# Patient Record
Sex: FEMALE | Race: WHITE | Employment: OTHER | URBAN - METROPOLITAN AREA
[De-identification: names, ages, dates, MRNs, and addresses within clinical notes are randomized per-mention and may not be internally consistent; named-entity substitution may affect disease eponyms.]

---

## 2018-03-01 PROBLEM — I48.19 PERSISTENT ATRIAL FIBRILLATION (HCC): Status: ACTIVE | Noted: 2018-03-01

## 2018-03-01 PROBLEM — S72.8X1A OTHER FRACTURE OF RIGHT FEMUR, INITIAL ENCOUNTER FOR CLOSED FRACTURE (HCC): Status: ACTIVE | Noted: 2018-03-01

## 2018-03-02 PROBLEM — S72.031A CLOSED DISPLACED MIDCERVICAL FRACTURE OF RIGHT FEMUR (HCC): Status: ACTIVE | Noted: 2018-03-01

## 2018-05-15 ENCOUNTER — HOSPITAL ENCOUNTER (OUTPATIENT)
Dept: GENERAL RADIOLOGY | Age: 83
Discharge: HOME | End: 2018-05-17
Payer: MEDICARE

## 2018-05-15 ENCOUNTER — HOSPITAL ENCOUNTER (OUTPATIENT)
Age: 83
Discharge: HOME | End: 2018-05-17
Payer: MEDICARE

## 2018-05-15 ENCOUNTER — HOSPITAL ENCOUNTER (OUTPATIENT)
Dept: MRI IMAGING | Age: 83
Discharge: HOME | End: 2018-05-17
Payer: MEDICARE

## 2018-05-15 DIAGNOSIS — M25.552 PAIN IN LEFT HIP: ICD-10-CM

## 2018-05-15 DIAGNOSIS — M25.552 LEFT HIP PAIN: ICD-10-CM

## 2018-05-15 DIAGNOSIS — M54.5 LOW BACK PAIN, UNSPECIFIED BACK PAIN LATERALITY, UNSPECIFIED CHRONICITY, WITH SCIATICA PRESENCE UNSPECIFIED: ICD-10-CM

## 2018-05-15 PROCEDURE — 73721 MRI JNT OF LWR EXTRE W/O DYE: CPT

## 2018-05-15 PROCEDURE — 72110 X-RAY EXAM L-2 SPINE 4/>VWS: CPT

## 2018-05-15 PROCEDURE — 73502 X-RAY EXAM HIP UNI 2-3 VIEWS: CPT

## 2019-02-24 ENCOUNTER — ANESTHESIA (OUTPATIENT)
Dept: OPERATING ROOM | Age: 84
DRG: 470 | End: 2019-02-24
Payer: MEDICARE

## 2019-02-24 ENCOUNTER — APPOINTMENT (OUTPATIENT)
Dept: CT IMAGING | Age: 84
DRG: 470 | End: 2019-02-24
Payer: MEDICARE

## 2019-02-24 ENCOUNTER — HOSPITAL ENCOUNTER (INPATIENT)
Age: 84
LOS: 4 days | Discharge: NURSING, REHAB OR LONG-TERM CARE | DRG: 470 | End: 2019-02-28
Attending: EMERGENCY MEDICINE | Admitting: INTERNAL MEDICINE
Payer: MEDICARE

## 2019-02-24 ENCOUNTER — APPOINTMENT (OUTPATIENT)
Dept: GENERAL RADIOLOGY | Age: 84
DRG: 470 | End: 2019-02-24
Payer: MEDICARE

## 2019-02-24 ENCOUNTER — ANESTHESIA EVENT (OUTPATIENT)
Dept: OPERATING ROOM | Age: 84
DRG: 470 | End: 2019-02-24
Payer: MEDICARE
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
## 2019-02-24 DIAGNOSIS — S72.002A CLOSED FRACTURE OF LEFT HIP, INITIAL ENCOUNTER (HCC): Primary | ICD-10-CM

## 2019-02-24 DIAGNOSIS — S72.012A CLOSED SUBCAPITAL FRACTURE OF LEFT FEMUR, INITIAL ENCOUNTER (HCC): ICD-10-CM

## 2019-02-24 PROBLEM — S72.032A CLOSED DISPLACED MIDCERVICAL FRACTURE OF LEFT FEMUR (HCC): Status: ACTIVE | Noted: 2019-02-24

## 2019-02-24 LAB
ABO/RH: NORMAL
ANION GAP SERPL CALCULATED.3IONS-SCNC: 15 MMOL/L (ref 7–16)
ANTIBODY SCREEN: NORMAL
APTT: 30.4 SEC (ref 24.5–35.1)
BACTERIA: ABNORMAL /HPF
BASOPHILS ABSOLUTE: 0.03 E9/L (ref 0–0.2)
BASOPHILS RELATIVE PERCENT: 0.2 % (ref 0–2)
BILIRUBIN URINE: NEGATIVE
BLOOD, URINE: ABNORMAL
BUN BLDV-MCNC: 13 MG/DL (ref 8–23)
CALCIUM SERPL-MCNC: 9.5 MG/DL (ref 8.6–10.2)
CHLORIDE BLD-SCNC: 95 MMOL/L (ref 98–107)
CLARITY: CLEAR
CO2: 23 MMOL/L (ref 22–29)
COLOR: YELLOW
CREAT SERPL-MCNC: 0.6 MG/DL (ref 0.5–1)
EOSINOPHILS ABSOLUTE: 0.02 E9/L (ref 0.05–0.5)
EOSINOPHILS RELATIVE PERCENT: 0.1 % (ref 0–6)
GFR AFRICAN AMERICAN: >60
GFR NON-AFRICAN AMERICAN: >60 ML/MIN/1.73
GLUCOSE BLD-MCNC: 126 MG/DL (ref 74–99)
GLUCOSE URINE: NEGATIVE MG/DL
HCT VFR BLD CALC: 41.9 % (ref 34–48)
HEMOGLOBIN: 14.2 G/DL (ref 11.5–15.5)
IMMATURE GRANULOCYTES #: 0.07 E9/L
IMMATURE GRANULOCYTES %: 0.5 % (ref 0–5)
INR BLD: 1.3
KETONES, URINE: NEGATIVE MG/DL
LEUKOCYTE ESTERASE, URINE: NEGATIVE
LYMPHOCYTES ABSOLUTE: 0.73 E9/L (ref 1.5–4)
LYMPHOCYTES RELATIVE PERCENT: 5 % (ref 20–42)
MCH RBC QN AUTO: 29.6 PG (ref 26–35)
MCHC RBC AUTO-ENTMCNC: 33.9 % (ref 32–34.5)
MCV RBC AUTO: 87.3 FL (ref 80–99.9)
MONOCYTES ABSOLUTE: 0.74 E9/L (ref 0.1–0.95)
MONOCYTES RELATIVE PERCENT: 5.1 % (ref 2–12)
NEUTROPHILS ABSOLUTE: 13.06 E9/L (ref 1.8–7.3)
NEUTROPHILS RELATIVE PERCENT: 89.1 % (ref 43–80)
NITRITE, URINE: NEGATIVE
PDW BLD-RTO: 12.3 FL (ref 11.5–15)
PH UA: 7.5 (ref 5–9)
PLATELET # BLD: 245 E9/L (ref 130–450)
PMV BLD AUTO: 9.9 FL (ref 7–12)
POTASSIUM SERPL-SCNC: 3.9 MMOL/L (ref 3.5–5)
PROTEIN UA: NEGATIVE MG/DL
PROTHROMBIN TIME: 14.1 SEC (ref 9.3–12.4)
RBC # BLD: 4.8 E12/L (ref 3.5–5.5)
RBC UA: ABNORMAL /HPF (ref 0–2)
SODIUM BLD-SCNC: 133 MMOL/L (ref 132–146)
SPECIFIC GRAVITY UA: 1.01 (ref 1–1.03)
UROBILINOGEN, URINE: 0.2 E.U./DL
WBC # BLD: 14.7 E9/L (ref 4.5–11.5)
WBC UA: ABNORMAL /HPF (ref 0–5)

## 2019-02-24 PROCEDURE — 99285 EMERGENCY DEPT VISIT HI MDM: CPT

## 2019-02-24 PROCEDURE — 70450 CT HEAD/BRAIN W/O DYE: CPT

## 2019-02-24 PROCEDURE — 81001 URINALYSIS AUTO W/SCOPE: CPT

## 2019-02-24 PROCEDURE — 86900 BLOOD TYPING SEROLOGIC ABO: CPT

## 2019-02-24 PROCEDURE — 2580000003 HC RX 258: Performed by: INTERNAL MEDICINE

## 2019-02-24 PROCEDURE — 6360000002 HC RX W HCPCS: Performed by: EMERGENCY MEDICINE

## 2019-02-24 PROCEDURE — 6370000000 HC RX 637 (ALT 250 FOR IP): Performed by: INTERNAL MEDICINE

## 2019-02-24 PROCEDURE — 86901 BLOOD TYPING SEROLOGIC RH(D): CPT

## 2019-02-24 PROCEDURE — 1200000000 HC SEMI PRIVATE

## 2019-02-24 PROCEDURE — 73502 X-RAY EXAM HIP UNI 2-3 VIEWS: CPT

## 2019-02-24 PROCEDURE — 85610 PROTHROMBIN TIME: CPT

## 2019-02-24 PROCEDURE — 85025 COMPLETE CBC W/AUTO DIFF WBC: CPT

## 2019-02-24 PROCEDURE — 96374 THER/PROPH/DIAG INJ IV PUSH: CPT

## 2019-02-24 PROCEDURE — 80048 BASIC METABOLIC PNL TOTAL CA: CPT

## 2019-02-24 PROCEDURE — 73552 X-RAY EXAM OF FEMUR 2/>: CPT

## 2019-02-24 PROCEDURE — 96375 TX/PRO/DX INJ NEW DRUG ADDON: CPT

## 2019-02-24 PROCEDURE — 71045 X-RAY EXAM CHEST 1 VIEW: CPT

## 2019-02-24 PROCEDURE — 6370000000 HC RX 637 (ALT 250 FOR IP): Performed by: ORTHOPAEDIC SURGERY

## 2019-02-24 PROCEDURE — 85730 THROMBOPLASTIN TIME PARTIAL: CPT

## 2019-02-24 PROCEDURE — 86850 RBC ANTIBODY SCREEN: CPT

## 2019-02-24 RX ORDER — HYDROCODONE BITARTRATE AND ACETAMINOPHEN 5; 325 MG/1; MG/1
0.5 TABLET ORAL EVERY 6 HOURS PRN
Status: DISCONTINUED | OUTPATIENT
Start: 2019-02-24 | End: 2019-02-28 | Stop reason: HOSPADM

## 2019-02-24 RX ORDER — ONDANSETRON 2 MG/ML
4 INJECTION INTRAMUSCULAR; INTRAVENOUS ONCE
Status: COMPLETED | OUTPATIENT
Start: 2019-02-24 | End: 2019-02-24

## 2019-02-24 RX ORDER — SODIUM CHLORIDE 0.9 % (FLUSH) 0.9 %
10 SYRINGE (ML) INJECTION EVERY 12 HOURS SCHEDULED
Status: DISCONTINUED | OUTPATIENT
Start: 2019-02-24 | End: 2019-02-24

## 2019-02-24 RX ORDER — FENTANYL CITRATE 50 UG/ML
25 INJECTION, SOLUTION INTRAMUSCULAR; INTRAVENOUS ONCE
Status: COMPLETED | OUTPATIENT
Start: 2019-02-24 | End: 2019-02-24

## 2019-02-24 RX ORDER — MORPHINE SULFATE 2 MG/ML
2 INJECTION, SOLUTION INTRAMUSCULAR; INTRAVENOUS
Status: DISCONTINUED | OUTPATIENT
Start: 2019-02-24 | End: 2019-02-28 | Stop reason: HOSPADM

## 2019-02-24 RX ORDER — DOCUSATE SODIUM 100 MG/1
100 CAPSULE, LIQUID FILLED ORAL DAILY
Status: DISCONTINUED | OUTPATIENT
Start: 2019-02-24 | End: 2019-02-28 | Stop reason: HOSPADM

## 2019-02-24 RX ORDER — HYDROCODONE BITARTRATE AND ACETAMINOPHEN 5; 325 MG/1; MG/1
1 TABLET ORAL EVERY 6 HOURS PRN
Status: DISCONTINUED | OUTPATIENT
Start: 2019-02-24 | End: 2019-02-28 | Stop reason: HOSPADM

## 2019-02-24 RX ORDER — ERGOCALCIFEROL 1.25 MG/1
50000 CAPSULE ORAL ONCE
Status: DISCONTINUED | OUTPATIENT
Start: 2019-02-24 | End: 2019-02-28 | Stop reason: HOSPADM

## 2019-02-24 RX ORDER — ONDANSETRON 2 MG/ML
4 INJECTION INTRAMUSCULAR; INTRAVENOUS EVERY 6 HOURS PRN
Status: DISCONTINUED | OUTPATIENT
Start: 2019-02-24 | End: 2019-02-28 | Stop reason: HOSPADM

## 2019-02-24 RX ORDER — MORPHINE SULFATE 2 MG/ML
1 INJECTION, SOLUTION INTRAMUSCULAR; INTRAVENOUS
Status: DISCONTINUED | OUTPATIENT
Start: 2019-02-24 | End: 2019-02-28 | Stop reason: HOSPADM

## 2019-02-24 RX ORDER — MORPHINE SULFATE 2 MG/ML
2 INJECTION, SOLUTION INTRAMUSCULAR; INTRAVENOUS EVERY 4 HOURS PRN
Status: DISCONTINUED | OUTPATIENT
Start: 2019-02-24 | End: 2019-02-25

## 2019-02-24 RX ORDER — SODIUM CHLORIDE 0.9 % (FLUSH) 0.9 %
10 SYRINGE (ML) INJECTION PRN
Status: DISCONTINUED | OUTPATIENT
Start: 2019-02-24 | End: 2019-02-28 | Stop reason: HOSPADM

## 2019-02-24 RX ORDER — SODIUM CHLORIDE 0.9 % (FLUSH) 0.9 %
10 SYRINGE (ML) INJECTION EVERY 12 HOURS SCHEDULED
Status: DISCONTINUED | OUTPATIENT
Start: 2019-02-24 | End: 2019-02-28 | Stop reason: HOSPADM

## 2019-02-24 RX ORDER — SODIUM CHLORIDE 0.9 % (FLUSH) 0.9 %
10 SYRINGE (ML) INJECTION PRN
Status: DISCONTINUED | OUTPATIENT
Start: 2019-02-24 | End: 2019-02-26

## 2019-02-24 RX ORDER — ACETAMINOPHEN 325 MG/1
650 TABLET ORAL EVERY 4 HOURS PRN
Status: DISCONTINUED | OUTPATIENT
Start: 2019-02-24 | End: 2019-02-28 | Stop reason: HOSPADM

## 2019-02-24 RX ORDER — CEFAZOLIN SODIUM 2 G/50ML
2 SOLUTION INTRAVENOUS SEE ADMIN INSTRUCTIONS
Status: COMPLETED | OUTPATIENT
Start: 2019-02-24 | End: 2019-02-26

## 2019-02-24 RX ADMIN — Medication 10 ML: at 21:10

## 2019-02-24 RX ADMIN — METOPROLOL TARTRATE 25 MG: 25 TABLET ORAL at 21:10

## 2019-02-24 RX ADMIN — FENTANYL CITRATE 25 MCG: 50 INJECTION, SOLUTION INTRAMUSCULAR; INTRAVENOUS at 13:55

## 2019-02-24 RX ADMIN — ONDANSETRON 4 MG: 2 INJECTION INTRAMUSCULAR; INTRAVENOUS at 13:55

## 2019-02-24 RX ADMIN — HYDROCODONE BITARTRATE AND ACETAMINOPHEN 0.5 TABLET: 5; 325 TABLET ORAL at 16:33

## 2019-02-24 ASSESSMENT — ENCOUNTER SYMPTOMS
SHORTNESS OF BREATH: 0
RHINORRHEA: 0
VOMITING: 0
BLOOD IN STOOL: 0
CONSTIPATION: 0
ABDOMINAL PAIN: 0
BACK PAIN: 0
COUGH: 0
SORE THROAT: 0
NAUSEA: 1
DIARRHEA: 0
COLOR CHANGE: 0

## 2019-02-24 ASSESSMENT — PAIN DESCRIPTION - LOCATION
LOCATION: HIP

## 2019-02-24 ASSESSMENT — PAIN DESCRIPTION - DESCRIPTORS
DESCRIPTORS: THROBBING
DESCRIPTORS: THROBBING
DESCRIPTORS: DISCOMFORT

## 2019-02-24 ASSESSMENT — PAIN DESCRIPTION - PAIN TYPE
TYPE: ACUTE PAIN

## 2019-02-24 ASSESSMENT — PAIN DESCRIPTION - FREQUENCY
FREQUENCY: CONTINUOUS
FREQUENCY: CONTINUOUS

## 2019-02-24 ASSESSMENT — PAIN DESCRIPTION - ONSET
ONSET: ON-GOING
ONSET: ON-GOING

## 2019-02-24 ASSESSMENT — PAIN DESCRIPTION - ORIENTATION
ORIENTATION: LEFT

## 2019-02-24 ASSESSMENT — PAIN SCALES - GENERAL
PAINLEVEL_OUTOF10: 1
PAINLEVEL_OUTOF10: 0
PAINLEVEL_OUTOF10: 5
PAINLEVEL_OUTOF10: 3

## 2019-02-24 ASSESSMENT — PAIN - FUNCTIONAL ASSESSMENT
PAIN_FUNCTIONAL_ASSESSMENT: PREVENTS OR INTERFERES WITH MANY ACTIVE NOT PASSIVE ACTIVITIES
PAIN_FUNCTIONAL_ASSESSMENT: PREVENTS OR INTERFERES WITH MANY ACTIVE NOT PASSIVE ACTIVITIES

## 2019-02-25 PROBLEM — I10 HTN (HYPERTENSION), BENIGN: Chronic | Status: ACTIVE | Noted: 2019-02-25

## 2019-02-25 PROBLEM — S72.012A CLOSED SUBCAPITAL FRACTURE OF LEFT FEMUR (HCC): Status: ACTIVE | Noted: 2019-02-25

## 2019-02-25 PROBLEM — Z86.73 HISTORY OF COMPLETED STROKE: Chronic | Status: ACTIVE | Noted: 2019-02-25

## 2019-02-25 PROBLEM — I48.19 PERSISTENT ATRIAL FIBRILLATION (HCC): Chronic | Status: ACTIVE | Noted: 2018-03-01

## 2019-02-25 LAB
HCT VFR BLD CALC: 37.9 % (ref 34–48)
HEMOGLOBIN: 12.8 G/DL (ref 11.5–15.5)
MCH RBC QN AUTO: 29.4 PG (ref 26–35)
MCHC RBC AUTO-ENTMCNC: 33.8 % (ref 32–34.5)
MCV RBC AUTO: 87.1 FL (ref 80–99.9)
PDW BLD-RTO: 12.3 FL (ref 11.5–15)
PLATELET # BLD: 234 E9/L (ref 130–450)
PMV BLD AUTO: 9.6 FL (ref 7–12)
RBC # BLD: 4.35 E12/L (ref 3.5–5.5)
WBC # BLD: 10.5 E9/L (ref 4.5–11.5)

## 2019-02-25 PROCEDURE — 85027 COMPLETE CBC AUTOMATED: CPT

## 2019-02-25 PROCEDURE — 87081 CULTURE SCREEN ONLY: CPT

## 2019-02-25 PROCEDURE — 2580000003 HC RX 258: Performed by: INTERNAL MEDICINE

## 2019-02-25 PROCEDURE — 6370000000 HC RX 637 (ALT 250 FOR IP): Performed by: ORTHOPAEDIC SURGERY

## 2019-02-25 PROCEDURE — 36415 COLL VENOUS BLD VENIPUNCTURE: CPT

## 2019-02-25 PROCEDURE — 1200000000 HC SEMI PRIVATE

## 2019-02-25 PROCEDURE — 6370000000 HC RX 637 (ALT 250 FOR IP): Performed by: INTERNAL MEDICINE

## 2019-02-25 RX ORDER — SODIUM CHLORIDE 9 MG/ML
INJECTION, SOLUTION INTRAVENOUS CONTINUOUS
Status: DISCONTINUED | OUTPATIENT
Start: 2019-02-26 | End: 2019-02-27

## 2019-02-25 RX ORDER — DEXTROSE AND SODIUM CHLORIDE 5; .9 G/100ML; G/100ML
INJECTION, SOLUTION INTRAVENOUS CONTINUOUS
Status: DISCONTINUED | OUTPATIENT
Start: 2019-02-26 | End: 2019-02-27

## 2019-02-25 RX ORDER — PANTOPRAZOLE SODIUM 20 MG/1
20 TABLET, DELAYED RELEASE ORAL
Status: DISCONTINUED | OUTPATIENT
Start: 2019-02-26 | End: 2019-02-28 | Stop reason: HOSPADM

## 2019-02-25 RX ORDER — CETIRIZINE HYDROCHLORIDE 10 MG/1
5 TABLET ORAL DAILY
Status: DISCONTINUED | OUTPATIENT
Start: 2019-02-25 | End: 2019-02-28 | Stop reason: HOSPADM

## 2019-02-25 RX ADMIN — METOPROLOL TARTRATE 25 MG: 25 TABLET ORAL at 09:33

## 2019-02-25 RX ADMIN — Medication 10 ML: at 21:39

## 2019-02-25 RX ADMIN — Medication 10 ML: at 09:34

## 2019-02-25 RX ADMIN — HYDROCODONE BITARTRATE AND ACETAMINOPHEN 1 TABLET: 5; 325 TABLET ORAL at 11:47

## 2019-02-25 RX ADMIN — METOPROLOL TARTRATE 25 MG: 25 TABLET ORAL at 21:38

## 2019-02-25 RX ADMIN — DOCUSATE SODIUM 100 MG: 100 CAPSULE, LIQUID FILLED ORAL at 09:33

## 2019-02-25 ASSESSMENT — PAIN SCALES - GENERAL
PAINLEVEL_OUTOF10: 4
PAINLEVEL_OUTOF10: 0

## 2019-02-26 ENCOUNTER — APPOINTMENT (OUTPATIENT)
Dept: GENERAL RADIOLOGY | Age: 84
DRG: 470 | End: 2019-02-26
Payer: MEDICARE

## 2019-02-26 VITALS — SYSTOLIC BLOOD PRESSURE: 152 MMHG | OXYGEN SATURATION: 100 % | DIASTOLIC BLOOD PRESSURE: 70 MMHG | TEMPERATURE: 98.2 F

## 2019-02-26 PROBLEM — E43 SEVERE PROTEIN-CALORIE MALNUTRITION (HCC): Chronic | Status: ACTIVE | Noted: 2019-02-26

## 2019-02-26 PROCEDURE — 2709999900 HC NON-CHARGEABLE SUPPLY: Performed by: ORTHOPAEDIC SURGERY

## 2019-02-26 PROCEDURE — 3700000001 HC ADD 15 MINUTES (ANESTHESIA): Performed by: ORTHOPAEDIC SURGERY

## 2019-02-26 PROCEDURE — 3700000000 HC ANESTHESIA ATTENDED CARE: Performed by: ORTHOPAEDIC SURGERY

## 2019-02-26 PROCEDURE — 7100000001 HC PACU RECOVERY - ADDTL 15 MIN: Performed by: ORTHOPAEDIC SURGERY

## 2019-02-26 PROCEDURE — 1200000000 HC SEMI PRIVATE

## 2019-02-26 PROCEDURE — 73501 X-RAY EXAM HIP UNI 1 VIEW: CPT

## 2019-02-26 PROCEDURE — 3600000004 HC SURGERY LEVEL 4 BASE: Performed by: ORTHOPAEDIC SURGERY

## 2019-02-26 PROCEDURE — 7100000000 HC PACU RECOVERY - FIRST 15 MIN: Performed by: ORTHOPAEDIC SURGERY

## 2019-02-26 PROCEDURE — 3600000014 HC SURGERY LEVEL 4 ADDTL 15MIN: Performed by: ORTHOPAEDIC SURGERY

## 2019-02-26 PROCEDURE — 6370000000 HC RX 637 (ALT 250 FOR IP): Performed by: INTERNAL MEDICINE

## 2019-02-26 PROCEDURE — 2580000003 HC RX 258: Performed by: INTERNAL MEDICINE

## 2019-02-26 PROCEDURE — C1776 JOINT DEVICE (IMPLANTABLE): HCPCS | Performed by: ORTHOPAEDIC SURGERY

## 2019-02-26 PROCEDURE — 2500000003 HC RX 250 WO HCPCS: Performed by: NURSE ANESTHETIST, CERTIFIED REGISTERED

## 2019-02-26 PROCEDURE — 0SRS0JA REPLACEMENT OF LEFT HIP JOINT, FEMORAL SURFACE WITH SYNTHETIC SUBSTITUTE, UNCEMENTED, OPEN APPROACH: ICD-10-PCS | Performed by: ORTHOPAEDIC SURGERY

## 2019-02-26 PROCEDURE — 2580000003 HC RX 258: Performed by: NURSE ANESTHETIST, CERTIFIED REGISTERED

## 2019-02-26 PROCEDURE — 6360000002 HC RX W HCPCS: Performed by: ORTHOPAEDIC SURGERY

## 2019-02-26 PROCEDURE — 2580000003 HC RX 258: Performed by: ORTHOPAEDIC SURGERY

## 2019-02-26 PROCEDURE — 6360000002 HC RX W HCPCS: Performed by: NURSE ANESTHETIST, CERTIFIED REGISTERED

## 2019-02-26 DEVICE — IMPLANTABLE DEVICE: Type: IMPLANTABLE DEVICE | Site: HIP | Status: FUNCTIONAL

## 2019-02-26 DEVICE — HEAD FEM DIA26MM +0MM OFFSET HIP CO CHROM V40 TAPR LO FRIC: Type: IMPLANTABLE DEVICE | Site: HIP | Status: FUNCTIONAL

## 2019-02-26 DEVICE — HEAD FEM OD43MM ID26MM UNIV CO CHROM COMPHSVE SZ ARRY FOR: Type: IMPLANTABLE DEVICE | Site: HIP | Status: FUNCTIONAL

## 2019-02-26 RX ORDER — ROCURONIUM BROMIDE 10 MG/ML
INJECTION, SOLUTION INTRAVENOUS PRN
Status: DISCONTINUED | OUTPATIENT
Start: 2019-02-26 | End: 2019-02-26 | Stop reason: SDUPTHER

## 2019-02-26 RX ORDER — ONDANSETRON 2 MG/ML
INJECTION INTRAMUSCULAR; INTRAVENOUS PRN
Status: DISCONTINUED | OUTPATIENT
Start: 2019-02-26 | End: 2019-02-26 | Stop reason: SDUPTHER

## 2019-02-26 RX ORDER — HYDROCODONE BITARTRATE AND ACETAMINOPHEN 5; 325 MG/1; MG/1
.5-1 TABLET ORAL EVERY 6 HOURS PRN
Qty: 28 TABLET | Refills: 0 | Status: SHIPPED | OUTPATIENT
Start: 2019-02-26 | End: 2019-03-05

## 2019-02-26 RX ORDER — LIDOCAINE HYDROCHLORIDE 20 MG/ML
INJECTION, SOLUTION EPIDURAL; INFILTRATION; INTRACAUDAL; PERINEURAL PRN
Status: DISCONTINUED | OUTPATIENT
Start: 2019-02-26 | End: 2019-02-26 | Stop reason: SDUPTHER

## 2019-02-26 RX ORDER — CEFAZOLIN SODIUM 1 G/50ML
1 SOLUTION INTRAVENOUS EVERY 8 HOURS
Status: COMPLETED | OUTPATIENT
Start: 2019-02-27 | End: 2019-02-27

## 2019-02-26 RX ORDER — PROPOFOL 10 MG/ML
INJECTION, EMULSION INTRAVENOUS PRN
Status: DISCONTINUED | OUTPATIENT
Start: 2019-02-26 | End: 2019-02-26 | Stop reason: SDUPTHER

## 2019-02-26 RX ORDER — NEOSTIGMINE METHYLSULFATE 1 MG/ML
INJECTION, SOLUTION INTRAVENOUS PRN
Status: DISCONTINUED | OUTPATIENT
Start: 2019-02-26 | End: 2019-02-26 | Stop reason: SDUPTHER

## 2019-02-26 RX ORDER — FENTANYL CITRATE 50 UG/ML
INJECTION, SOLUTION INTRAMUSCULAR; INTRAVENOUS PRN
Status: DISCONTINUED | OUTPATIENT
Start: 2019-02-26 | End: 2019-02-26 | Stop reason: SDUPTHER

## 2019-02-26 RX ORDER — DEXAMETHASONE SODIUM PHOSPHATE 4 MG/ML
INJECTION, SOLUTION INTRA-ARTICULAR; INTRALESIONAL; INTRAMUSCULAR; INTRAVENOUS; SOFT TISSUE PRN
Status: DISCONTINUED | OUTPATIENT
Start: 2019-02-26 | End: 2019-02-26 | Stop reason: SDUPTHER

## 2019-02-26 RX ORDER — GLYCOPYRROLATE 0.2 MG/ML
INJECTION INTRAMUSCULAR; INTRAVENOUS PRN
Status: DISCONTINUED | OUTPATIENT
Start: 2019-02-26 | End: 2019-02-26 | Stop reason: SDUPTHER

## 2019-02-26 RX ORDER — SODIUM CHLORIDE, SODIUM LACTATE, POTASSIUM CHLORIDE, CALCIUM CHLORIDE 600; 310; 30; 20 MG/100ML; MG/100ML; MG/100ML; MG/100ML
INJECTION, SOLUTION INTRAVENOUS CONTINUOUS PRN
Status: DISCONTINUED | OUTPATIENT
Start: 2019-02-26 | End: 2019-02-26 | Stop reason: SDUPTHER

## 2019-02-26 RX ADMIN — LIDOCAINE HYDROCHLORIDE 50 MG: 20 INJECTION, SOLUTION EPIDURAL; INFILTRATION; INTRACAUDAL; PERINEURAL at 18:33

## 2019-02-26 RX ADMIN — GLYCOPYRROLATE 0.4 MG: 0.2 INJECTION, SOLUTION INTRAMUSCULAR; INTRAVENOUS at 19:36

## 2019-02-26 RX ADMIN — PANTOPRAZOLE SODIUM 20 MG: 20 TABLET, DELAYED RELEASE ORAL at 07:03

## 2019-02-26 RX ADMIN — Medication 2 MG: at 19:36

## 2019-02-26 RX ADMIN — METOPROLOL TARTRATE 25 MG: 25 TABLET ORAL at 21:04

## 2019-02-26 RX ADMIN — ONDANSETRON HYDROCHLORIDE 4 MG: 2 INJECTION, SOLUTION INTRAMUSCULAR; INTRAVENOUS at 18:50

## 2019-02-26 RX ADMIN — CEFAZOLIN SODIUM 2 G: 2 SOLUTION INTRAVENOUS at 18:28

## 2019-02-26 RX ADMIN — PROPOFOL 80 MG: 10 INJECTION, EMULSION INTRAVENOUS at 18:33

## 2019-02-26 RX ADMIN — ROCURONIUM BROMIDE 30 MG: 10 SOLUTION INTRAVENOUS at 18:33

## 2019-02-26 RX ADMIN — SODIUM CHLORIDE: 9 INJECTION, SOLUTION INTRAVENOUS at 09:11

## 2019-02-26 RX ADMIN — SODIUM CHLORIDE, POTASSIUM CHLORIDE, SODIUM LACTATE AND CALCIUM CHLORIDE: 600; 310; 30; 20 INJECTION, SOLUTION INTRAVENOUS at 18:28

## 2019-02-26 RX ADMIN — DEXAMETHASONE SODIUM PHOSPHATE 8 MG: 4 INJECTION, SOLUTION INTRAMUSCULAR; INTRAVENOUS at 18:50

## 2019-02-26 RX ADMIN — METOPROLOL TARTRATE 25 MG: 25 TABLET ORAL at 09:11

## 2019-02-26 RX ADMIN — DEXTROSE AND SODIUM CHLORIDE: 5; 900 INJECTION, SOLUTION INTRAVENOUS at 00:03

## 2019-02-26 RX ADMIN — FENTANYL CITRATE 100 MCG: 50 INJECTION, SOLUTION INTRAMUSCULAR; INTRAVENOUS at 18:33

## 2019-02-26 ASSESSMENT — PULMONARY FUNCTION TESTS
PIF_VALUE: 18
PIF_VALUE: 2
PIF_VALUE: 19
PIF_VALUE: 18
PIF_VALUE: 2
PIF_VALUE: 19
PIF_VALUE: 18
PIF_VALUE: 19
PIF_VALUE: 18
PIF_VALUE: 18
PIF_VALUE: 16
PIF_VALUE: 18
PIF_VALUE: 14
PIF_VALUE: 20
PIF_VALUE: 18
PIF_VALUE: 18
PIF_VALUE: 19
PIF_VALUE: 3
PIF_VALUE: 17
PIF_VALUE: 18
PIF_VALUE: 6
PIF_VALUE: 18
PIF_VALUE: 18
PIF_VALUE: 0
PIF_VALUE: 18
PIF_VALUE: 10
PIF_VALUE: 17
PIF_VALUE: 18
PIF_VALUE: 2
PIF_VALUE: 17
PIF_VALUE: 6
PIF_VALUE: 17
PIF_VALUE: 18
PIF_VALUE: 18
PIF_VALUE: 23
PIF_VALUE: 0
PIF_VALUE: 18
PIF_VALUE: 25
PIF_VALUE: 18
PIF_VALUE: 2
PIF_VALUE: 18
PIF_VALUE: 8
PIF_VALUE: 24
PIF_VALUE: 19
PIF_VALUE: 17
PIF_VALUE: 1
PIF_VALUE: 3
PIF_VALUE: 18
PIF_VALUE: 18
PIF_VALUE: 17
PIF_VALUE: 18
PIF_VALUE: 2
PIF_VALUE: 15
PIF_VALUE: 18
PIF_VALUE: 18
PIF_VALUE: 0
PIF_VALUE: 0
PIF_VALUE: 18
PIF_VALUE: 0
PIF_VALUE: 18
PIF_VALUE: 18

## 2019-02-26 ASSESSMENT — PAIN DESCRIPTION - PAIN TYPE
TYPE: ACUTE PAIN
TYPE: SURGICAL PAIN

## 2019-02-26 ASSESSMENT — PAIN DESCRIPTION - DESCRIPTORS: DESCRIPTORS: ACHING;DISCOMFORT

## 2019-02-26 ASSESSMENT — PAIN SCALES - GENERAL
PAINLEVEL_OUTOF10: 0
PAINLEVEL_OUTOF10: 5

## 2019-02-26 ASSESSMENT — PAIN DESCRIPTION - LOCATION
LOCATION: HIP

## 2019-02-26 ASSESSMENT — PAIN DESCRIPTION - ONSET: ONSET: GRADUAL

## 2019-02-26 ASSESSMENT — PAIN DESCRIPTION - FREQUENCY: FREQUENCY: CONTINUOUS

## 2019-02-26 ASSESSMENT — PAIN DESCRIPTION - PROGRESSION: CLINICAL_PROGRESSION: GRADUALLY WORSENING

## 2019-02-26 ASSESSMENT — PAIN - FUNCTIONAL ASSESSMENT: PAIN_FUNCTIONAL_ASSESSMENT: PREVENTS OR INTERFERES SOME ACTIVE ACTIVITIES AND ADLS

## 2019-02-26 ASSESSMENT — PAIN DESCRIPTION - ORIENTATION
ORIENTATION: LEFT

## 2019-02-27 LAB
ANION GAP SERPL CALCULATED.3IONS-SCNC: 12 MMOL/L (ref 7–16)
BUN BLDV-MCNC: 13 MG/DL (ref 8–23)
CALCIUM SERPL-MCNC: 7.7 MG/DL (ref 8.6–10.2)
CHLORIDE BLD-SCNC: 100 MMOL/L (ref 98–107)
CO2: 22 MMOL/L (ref 22–29)
CREAT SERPL-MCNC: 0.5 MG/DL (ref 0.5–1)
GFR AFRICAN AMERICAN: >60
GFR NON-AFRICAN AMERICAN: >60 ML/MIN/1.73
GLUCOSE BLD-MCNC: 150 MG/DL (ref 74–99)
HCT VFR BLD CALC: 36.1 % (ref 34–48)
HEMOGLOBIN: 11.8 G/DL (ref 11.5–15.5)
MCH RBC QN AUTO: 28.9 PG (ref 26–35)
MCHC RBC AUTO-ENTMCNC: 32.7 % (ref 32–34.5)
MCV RBC AUTO: 88.5 FL (ref 80–99.9)
MRSA CULTURE ONLY: NORMAL
PDW BLD-RTO: 12.2 FL (ref 11.5–15)
PLATELET # BLD: 190 E9/L (ref 130–450)
PMV BLD AUTO: 9.7 FL (ref 7–12)
POTASSIUM SERPL-SCNC: 3.9 MMOL/L (ref 3.5–5)
RBC # BLD: 4.08 E12/L (ref 3.5–5.5)
SODIUM BLD-SCNC: 134 MMOL/L (ref 132–146)
WBC # BLD: 8.1 E9/L (ref 4.5–11.5)

## 2019-02-27 PROCEDURE — 88305 TISSUE EXAM BY PATHOLOGIST: CPT

## 2019-02-27 PROCEDURE — 1200000000 HC SEMI PRIVATE

## 2019-02-27 PROCEDURE — 6370000000 HC RX 637 (ALT 250 FOR IP): Performed by: INTERNAL MEDICINE

## 2019-02-27 PROCEDURE — 97165 OT EVAL LOW COMPLEX 30 MIN: CPT

## 2019-02-27 PROCEDURE — 6370000000 HC RX 637 (ALT 250 FOR IP): Performed by: ORTHOPAEDIC SURGERY

## 2019-02-27 PROCEDURE — 6360000002 HC RX W HCPCS: Performed by: ORTHOPAEDIC SURGERY

## 2019-02-27 PROCEDURE — 2580000003 HC RX 258: Performed by: INTERNAL MEDICINE

## 2019-02-27 PROCEDURE — 88311 DECALCIFY TISSUE: CPT

## 2019-02-27 PROCEDURE — 97530 THERAPEUTIC ACTIVITIES: CPT

## 2019-02-27 PROCEDURE — 36415 COLL VENOUS BLD VENIPUNCTURE: CPT

## 2019-02-27 PROCEDURE — 85027 COMPLETE CBC AUTOMATED: CPT

## 2019-02-27 PROCEDURE — 97161 PT EVAL LOW COMPLEX 20 MIN: CPT

## 2019-02-27 PROCEDURE — 80048 BASIC METABOLIC PNL TOTAL CA: CPT

## 2019-02-27 RX ADMIN — HYDROCODONE BITARTRATE AND ACETAMINOPHEN 1 TABLET: 5; 325 TABLET ORAL at 21:50

## 2019-02-27 RX ADMIN — METOPROLOL TARTRATE 25 MG: 25 TABLET ORAL at 21:50

## 2019-02-27 RX ADMIN — Medication 10 ML: at 21:00

## 2019-02-27 RX ADMIN — CEFAZOLIN SODIUM 1 G: 1 SOLUTION INTRAVENOUS at 02:20

## 2019-02-27 RX ADMIN — CEFAZOLIN SODIUM 1 G: 1 SOLUTION INTRAVENOUS at 09:45

## 2019-02-27 RX ADMIN — HYDROCODONE BITARTRATE AND ACETAMINOPHEN 1 TABLET: 5; 325 TABLET ORAL at 06:48

## 2019-02-27 RX ADMIN — APIXABAN 2.5 MG: 2.5 TABLET, FILM COATED ORAL at 21:50

## 2019-02-27 RX ADMIN — MORPHINE SULFATE 1 MG: 2 INJECTION, SOLUTION INTRAMUSCULAR; INTRAVENOUS at 02:56

## 2019-02-27 RX ADMIN — METOPROLOL TARTRATE 25 MG: 25 TABLET ORAL at 09:02

## 2019-02-27 RX ADMIN — PANTOPRAZOLE SODIUM 20 MG: 20 TABLET, DELAYED RELEASE ORAL at 06:48

## 2019-02-27 RX ADMIN — DOCUSATE SODIUM 100 MG: 100 CAPSULE, LIQUID FILLED ORAL at 09:02

## 2019-02-27 ASSESSMENT — PAIN DESCRIPTION - DESCRIPTORS
DESCRIPTORS: ACHING;DISCOMFORT

## 2019-02-27 ASSESSMENT — PAIN - FUNCTIONAL ASSESSMENT
PAIN_FUNCTIONAL_ASSESSMENT: PREVENTS OR INTERFERES SOME ACTIVE ACTIVITIES AND ADLS

## 2019-02-27 ASSESSMENT — PAIN SCALES - GENERAL
PAINLEVEL_OUTOF10: 6
PAINLEVEL_OUTOF10: 0
PAINLEVEL_OUTOF10: 5
PAINLEVEL_OUTOF10: 5

## 2019-02-27 ASSESSMENT — PAIN DESCRIPTION - FREQUENCY
FREQUENCY: CONTINUOUS

## 2019-02-27 ASSESSMENT — PAIN DESCRIPTION - PROGRESSION
CLINICAL_PROGRESSION: GRADUALLY WORSENING

## 2019-02-27 ASSESSMENT — PAIN DESCRIPTION - PAIN TYPE
TYPE: SURGICAL PAIN

## 2019-02-27 ASSESSMENT — PAIN DESCRIPTION - ORIENTATION
ORIENTATION: LEFT

## 2019-02-27 ASSESSMENT — PAIN DESCRIPTION - LOCATION
LOCATION: HIP

## 2019-02-27 ASSESSMENT — PAIN DESCRIPTION - ONSET
ONSET: GRADUAL

## 2019-02-28 VITALS
OXYGEN SATURATION: 98 % | HEIGHT: 65 IN | DIASTOLIC BLOOD PRESSURE: 56 MMHG | BODY MASS INDEX: 17.99 KG/M2 | RESPIRATION RATE: 16 BRPM | TEMPERATURE: 97.7 F | WEIGHT: 108 LBS | SYSTOLIC BLOOD PRESSURE: 107 MMHG | HEART RATE: 80 BPM

## 2019-02-28 PROBLEM — I48.0 PAROXYSMAL ATRIAL FIBRILLATION (HCC): Chronic | Status: ACTIVE | Noted: 2019-02-28

## 2019-02-28 LAB
EKG ATRIAL RATE: 74 BPM
EKG P AXIS: 58 DEGREES
EKG P-R INTERVAL: 152 MS
EKG Q-T INTERVAL: 394 MS
EKG QRS DURATION: 64 MS
EKG QTC CALCULATION (BAZETT): 437 MS
EKG R AXIS: 5 DEGREES
EKG T AXIS: 52 DEGREES
EKG VENTRICULAR RATE: 74 BPM
HCT VFR BLD CALC: 29.8 % (ref 34–48)
HEMOGLOBIN: 10.2 G/DL (ref 11.5–15.5)
MCH RBC QN AUTO: 29.9 PG (ref 26–35)
MCHC RBC AUTO-ENTMCNC: 34.2 % (ref 32–34.5)
MCV RBC AUTO: 87.4 FL (ref 80–99.9)
PDW BLD-RTO: 12.4 FL (ref 11.5–15)
PLATELET # BLD: 189 E9/L (ref 130–450)
PMV BLD AUTO: 9.6 FL (ref 7–12)
RBC # BLD: 3.41 E12/L (ref 3.5–5.5)
WBC # BLD: 7.2 E9/L (ref 4.5–11.5)

## 2019-02-28 PROCEDURE — 97530 THERAPEUTIC ACTIVITIES: CPT

## 2019-02-28 PROCEDURE — 6370000000 HC RX 637 (ALT 250 FOR IP): Performed by: INTERNAL MEDICINE

## 2019-02-28 PROCEDURE — 85027 COMPLETE CBC AUTOMATED: CPT

## 2019-02-28 PROCEDURE — 36415 COLL VENOUS BLD VENIPUNCTURE: CPT

## 2019-02-28 PROCEDURE — 97535 SELF CARE MNGMENT TRAINING: CPT

## 2019-02-28 PROCEDURE — 2580000003 HC RX 258: Performed by: INTERNAL MEDICINE

## 2019-02-28 PROCEDURE — 97110 THERAPEUTIC EXERCISES: CPT

## 2019-02-28 PROCEDURE — 6370000000 HC RX 637 (ALT 250 FOR IP): Performed by: ORTHOPAEDIC SURGERY

## 2019-02-28 RX ORDER — PSEUDOEPHEDRINE HCL 30 MG
100 TABLET ORAL DAILY
DISCHARGE
Start: 2019-03-01

## 2019-02-28 RX ORDER — ERGOCALCIFEROL (VITAMIN D2) 1250 MCG
50000 CAPSULE ORAL ONCE
Qty: 1 CAPSULE | Refills: 0 | Status: ON HOLD | DISCHARGE
Start: 2019-02-28 | End: 2022-06-22 | Stop reason: HOSPADM

## 2019-02-28 RX ADMIN — HYDROCODONE BITARTRATE AND ACETAMINOPHEN 1 TABLET: 5; 325 TABLET ORAL at 06:45

## 2019-02-28 RX ADMIN — PANTOPRAZOLE SODIUM 20 MG: 20 TABLET, DELAYED RELEASE ORAL at 06:45

## 2019-02-28 RX ADMIN — Medication 10 ML: at 09:03

## 2019-02-28 RX ADMIN — METOPROLOL TARTRATE 25 MG: 25 TABLET ORAL at 09:04

## 2019-02-28 RX ADMIN — CETIRIZINE HYDROCHLORIDE 5 MG: 10 TABLET, FILM COATED ORAL at 09:04

## 2019-02-28 RX ADMIN — APIXABAN 2.5 MG: 2.5 TABLET, FILM COATED ORAL at 09:04

## 2019-02-28 ASSESSMENT — PAIN SCALES - GENERAL
PAINLEVEL_OUTOF10: 5
PAINLEVEL_OUTOF10: 0

## 2019-02-28 ASSESSMENT — PAIN DESCRIPTION - DESCRIPTORS: DESCRIPTORS: ACHING;DISCOMFORT

## 2019-02-28 ASSESSMENT — PAIN DESCRIPTION - LOCATION
LOCATION: HIP
LOCATION: HIP

## 2019-02-28 ASSESSMENT — PAIN DESCRIPTION - ONSET: ONSET: GRADUAL

## 2019-02-28 ASSESSMENT — PAIN - FUNCTIONAL ASSESSMENT: PAIN_FUNCTIONAL_ASSESSMENT: PREVENTS OR INTERFERES SOME ACTIVE ACTIVITIES AND ADLS

## 2019-02-28 ASSESSMENT — PAIN DESCRIPTION - PROGRESSION: CLINICAL_PROGRESSION: GRADUALLY WORSENING

## 2019-02-28 ASSESSMENT — PAIN DESCRIPTION - ORIENTATION
ORIENTATION: LEFT
ORIENTATION: LEFT

## 2019-02-28 ASSESSMENT — PAIN DESCRIPTION - PAIN TYPE
TYPE: SURGICAL PAIN
TYPE: SURGICAL PAIN

## 2019-02-28 ASSESSMENT — PAIN DESCRIPTION - FREQUENCY: FREQUENCY: CONTINUOUS

## 2022-06-19 ENCOUNTER — HOSPITAL ENCOUNTER (EMERGENCY)
Age: 87
Discharge: ANOTHER HOSPITAL | End: 2022-06-19
Attending: EMERGENCY MEDICINE
Payer: MEDICARE

## 2022-06-19 ENCOUNTER — APPOINTMENT (OUTPATIENT)
Dept: GENERAL RADIOLOGY | Age: 87
End: 2022-06-19
Payer: MEDICARE

## 2022-06-19 ENCOUNTER — HOSPITAL ENCOUNTER (INPATIENT)
Age: 87
LOS: 4 days | Discharge: NURSING, REHAB OR LONG-TERM CARE | DRG: 552 | End: 2022-06-23
Attending: STUDENT IN AN ORGANIZED HEALTH CARE EDUCATION/TRAINING PROGRAM | Admitting: SURGERY
Payer: MEDICARE

## 2022-06-19 ENCOUNTER — APPOINTMENT (OUTPATIENT)
Dept: CT IMAGING | Age: 87
End: 2022-06-19
Payer: MEDICARE

## 2022-06-19 VITALS
DIASTOLIC BLOOD PRESSURE: 70 MMHG | SYSTOLIC BLOOD PRESSURE: 170 MMHG | TEMPERATURE: 98 F | HEIGHT: 65 IN | HEART RATE: 65 BPM | BODY MASS INDEX: 17.99 KG/M2 | WEIGHT: 108 LBS | OXYGEN SATURATION: 95 % | RESPIRATION RATE: 14 BRPM

## 2022-06-19 DIAGNOSIS — S22.089A CLOSED FRACTURE OF TWELFTH THORACIC VERTEBRA, UNSPECIFIED FRACTURE MORPHOLOGY, INITIAL ENCOUNTER (HCC): Primary | ICD-10-CM

## 2022-06-19 DIAGNOSIS — S72.012A CLOSED SUBCAPITAL FRACTURE OF LEFT FEMUR, INITIAL ENCOUNTER (HCC): ICD-10-CM
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
## 2022-06-19 DIAGNOSIS — S22.080A COMPRESSION FRACTURE OF T12 VERTEBRA, INITIAL ENCOUNTER (HCC): Primary | ICD-10-CM

## 2022-06-19 PROBLEM — T14.90XA TRAUMA: Status: ACTIVE | Noted: 2022-06-19

## 2022-06-19 LAB
ACETAMINOPHEN LEVEL: <5 MCG/ML (ref 10–30)
ALBUMIN SERPL-MCNC: 4.1 G/DL (ref 3.5–5.2)
ALP BLD-CCNC: 61 U/L (ref 35–104)
ALT SERPL-CCNC: 12 U/L (ref 0–32)
AMPHETAMINE SCREEN, URINE: NOT DETECTED
ANION GAP SERPL CALCULATED.3IONS-SCNC: 15 MMOL/L (ref 7–16)
APTT: 27.3 SEC (ref 24.5–35.1)
AST SERPL-CCNC: 22 U/L (ref 0–31)
BACTERIA: ABNORMAL /HPF
BARBITURATE SCREEN URINE: NOT DETECTED
BASOPHILS ABSOLUTE: 0.02 E9/L (ref 0–0.2)
BASOPHILS RELATIVE PERCENT: 0.2 % (ref 0–2)
BENZODIAZEPINE SCREEN, URINE: NOT DETECTED
BILIRUB SERPL-MCNC: 0.8 MG/DL (ref 0–1.2)
BILIRUBIN URINE: NEGATIVE
BLOOD, URINE: ABNORMAL
BUN BLDV-MCNC: 10 MG/DL (ref 6–23)
CALCIUM SERPL-MCNC: 9.5 MG/DL (ref 8.6–10.2)
CANNABINOID SCREEN URINE: NOT DETECTED
CHLORIDE BLD-SCNC: 94 MMOL/L (ref 98–107)
CLARITY: CLEAR
CO2: 24 MMOL/L (ref 22–29)
COCAINE METABOLITE SCREEN URINE: NOT DETECTED
COLOR: YELLOW
CREAT SERPL-MCNC: 0.6 MG/DL (ref 0.5–1)
EKG ATRIAL RATE: 84 BPM
EKG P AXIS: -15 DEGREES
EKG P-R INTERVAL: 106 MS
EKG Q-T INTERVAL: 368 MS
EKG QRS DURATION: 64 MS
EKG QTC CALCULATION (BAZETT): 434 MS
EKG R AXIS: -4 DEGREES
EKG T AXIS: 123 DEGREES
EKG VENTRICULAR RATE: 84 BPM
EOSINOPHILS ABSOLUTE: 0.02 E9/L (ref 0.05–0.5)
EOSINOPHILS RELATIVE PERCENT: 0.2 % (ref 0–6)
EPITHELIAL CELLS, UA: ABNORMAL /HPF
ETHANOL: <10 MG/DL (ref 0–0.08)
FENTANYL SCREEN, URINE: NOT DETECTED
GFR AFRICAN AMERICAN: >60
GFR NON-AFRICAN AMERICAN: >60 ML/MIN/1.73
GLUCOSE BLD-MCNC: 130 MG/DL (ref 74–99)
GLUCOSE URINE: NEGATIVE MG/DL
HCT VFR BLD CALC: 39.8 % (ref 34–48)
HEMOGLOBIN: 13.6 G/DL (ref 11.5–15.5)
IMMATURE GRANULOCYTES #: 0.07 E9/L
IMMATURE GRANULOCYTES %: 0.7 % (ref 0–5)
INR BLD: 2.7
KETONES, URINE: 15 MG/DL
LEUKOCYTE ESTERASE, URINE: NEGATIVE
LYMPHOCYTES ABSOLUTE: 0.7 E9/L (ref 1.5–4)
LYMPHOCYTES RELATIVE PERCENT: 7.1 % (ref 20–42)
Lab: NORMAL
MCH RBC QN AUTO: 29.9 PG (ref 26–35)
MCHC RBC AUTO-ENTMCNC: 34.2 % (ref 32–34.5)
MCV RBC AUTO: 87.5 FL (ref 80–99.9)
METHADONE SCREEN, URINE: NOT DETECTED
MONOCYTES ABSOLUTE: 1.22 E9/L (ref 0.1–0.95)
MONOCYTES RELATIVE PERCENT: 12.4 % (ref 2–12)
NEUTROPHILS ABSOLUTE: 7.79 E9/L (ref 1.8–7.3)
NEUTROPHILS RELATIVE PERCENT: 79.4 % (ref 43–80)
NITRITE, URINE: NEGATIVE
OPIATE SCREEN URINE: NOT DETECTED
OXYCODONE URINE: NOT DETECTED
PDW BLD-RTO: 12.1 FL (ref 11.5–15)
PH UA: 7.5 (ref 5–9)
PHENCYCLIDINE SCREEN URINE: NOT DETECTED
PLATELET # BLD: 177 E9/L (ref 130–450)
PMV BLD AUTO: 9.2 FL (ref 7–12)
POTASSIUM REFLEX MAGNESIUM: 3.7 MMOL/L (ref 3.5–5)
PROTEIN UA: NEGATIVE MG/DL
PROTHROMBIN TIME: 29.5 SEC (ref 9.3–12.4)
RBC # BLD: 4.55 E12/L (ref 3.5–5.5)
RBC UA: ABNORMAL /HPF (ref 0–2)
SALICYLATE, SERUM: <0.3 MG/DL (ref 0–30)
SODIUM BLD-SCNC: 133 MMOL/L (ref 132–146)
SPECIFIC GRAVITY UA: 1.01 (ref 1–1.03)
TOTAL PROTEIN: 7 G/DL (ref 6.4–8.3)
TRICYCLIC ANTIDEPRESSANTS SCREEN SERUM: NEGATIVE NG/ML
TROPONIN, HIGH SENSITIVITY: 13 NG/L (ref 0–9)
TROPONIN, HIGH SENSITIVITY: 15 NG/L (ref 0–9)
UROBILINOGEN, URINE: 0.2 E.U./DL
WBC # BLD: 9.8 E9/L (ref 4.5–11.5)
WBC UA: ABNORMAL /HPF (ref 0–5)

## 2022-06-19 PROCEDURE — 85610 PROTHROMBIN TIME: CPT

## 2022-06-19 PROCEDURE — 81001 URINALYSIS AUTO W/SCOPE: CPT

## 2022-06-19 PROCEDURE — 85730 THROMBOPLASTIN TIME PARTIAL: CPT

## 2022-06-19 PROCEDURE — 99285 EMERGENCY DEPT VISIT HI MDM: CPT

## 2022-06-19 PROCEDURE — 96375 TX/PRO/DX INJ NEW DRUG ADDON: CPT

## 2022-06-19 PROCEDURE — 70450 CT HEAD/BRAIN W/O DYE: CPT

## 2022-06-19 PROCEDURE — 71045 X-RAY EXAM CHEST 1 VIEW: CPT

## 2022-06-19 PROCEDURE — 93005 ELECTROCARDIOGRAM TRACING: CPT | Performed by: STUDENT IN AN ORGANIZED HEALTH CARE EDUCATION/TRAINING PROGRAM

## 2022-06-19 PROCEDURE — 2500000003 HC RX 250 WO HCPCS: Performed by: STUDENT IN AN ORGANIZED HEALTH CARE EDUCATION/TRAINING PROGRAM

## 2022-06-19 PROCEDURE — 36415 COLL VENOUS BLD VENIPUNCTURE: CPT

## 2022-06-19 PROCEDURE — 80179 DRUG ASSAY SALICYLATE: CPT

## 2022-06-19 PROCEDURE — 73502 X-RAY EXAM HIP UNI 2-3 VIEWS: CPT

## 2022-06-19 PROCEDURE — 74176 CT ABD & PELVIS W/O CONTRAST: CPT

## 2022-06-19 PROCEDURE — 72125 CT NECK SPINE W/O DYE: CPT

## 2022-06-19 PROCEDURE — 6360000002 HC RX W HCPCS: Performed by: EMERGENCY MEDICINE

## 2022-06-19 PROCEDURE — 2580000003 HC RX 258: Performed by: STUDENT IN AN ORGANIZED HEALTH CARE EDUCATION/TRAINING PROGRAM

## 2022-06-19 PROCEDURE — 80307 DRUG TEST PRSMV CHEM ANLYZR: CPT

## 2022-06-19 PROCEDURE — 6360000002 HC RX W HCPCS: Performed by: STUDENT IN AN ORGANIZED HEALTH CARE EDUCATION/TRAINING PROGRAM

## 2022-06-19 PROCEDURE — 72131 CT LUMBAR SPINE W/O DYE: CPT

## 2022-06-19 PROCEDURE — 1200000000 HC SEMI PRIVATE

## 2022-06-19 PROCEDURE — 84484 ASSAY OF TROPONIN QUANT: CPT

## 2022-06-19 PROCEDURE — 80143 DRUG ASSAY ACETAMINOPHEN: CPT

## 2022-06-19 PROCEDURE — 6370000000 HC RX 637 (ALT 250 FOR IP): Performed by: STUDENT IN AN ORGANIZED HEALTH CARE EDUCATION/TRAINING PROGRAM

## 2022-06-19 PROCEDURE — 80053 COMPREHEN METABOLIC PANEL: CPT

## 2022-06-19 PROCEDURE — 99222 1ST HOSP IP/OBS MODERATE 55: CPT | Performed by: NEUROLOGICAL SURGERY

## 2022-06-19 PROCEDURE — 96374 THER/PROPH/DIAG INJ IV PUSH: CPT

## 2022-06-19 PROCEDURE — 82077 ASSAY SPEC XCP UR&BREATH IA: CPT

## 2022-06-19 PROCEDURE — 85025 COMPLETE CBC W/AUTO DIFF WBC: CPT

## 2022-06-19 RX ORDER — MORPHINE SULFATE 2 MG/ML
2 INJECTION, SOLUTION INTRAMUSCULAR; INTRAVENOUS ONCE
Status: COMPLETED | OUTPATIENT
Start: 2022-06-19 | End: 2022-06-19

## 2022-06-19 RX ORDER — SODIUM CHLORIDE 0.9 % (FLUSH) 0.9 %
10 SYRINGE (ML) INJECTION EVERY 12 HOURS SCHEDULED
Status: DISCONTINUED | OUTPATIENT
Start: 2022-06-19 | End: 2022-06-23 | Stop reason: HOSPADM

## 2022-06-19 RX ORDER — LABETALOL 100 MG/1
100 TABLET, FILM COATED ORAL 2 TIMES DAILY
COMMUNITY

## 2022-06-19 RX ORDER — ACETAMINOPHEN 325 MG/1
650 TABLET ORAL EVERY 6 HOURS
Status: DISCONTINUED | OUTPATIENT
Start: 2022-06-19 | End: 2022-06-23 | Stop reason: HOSPADM

## 2022-06-19 RX ORDER — SODIUM CHLORIDE 9 MG/ML
INJECTION, SOLUTION INTRAVENOUS CONTINUOUS
Status: DISCONTINUED | OUTPATIENT
Start: 2022-06-19 | End: 2022-06-19

## 2022-06-19 RX ORDER — OXYCODONE HYDROCHLORIDE 5 MG/1
2.5 TABLET ORAL EVERY 4 HOURS PRN
Status: DISCONTINUED | OUTPATIENT
Start: 2022-06-19 | End: 2022-06-23 | Stop reason: HOSPADM

## 2022-06-19 RX ORDER — POLYETHYLENE GLYCOL 3350 17 G/17G
17 POWDER, FOR SOLUTION ORAL DAILY
Status: DISCONTINUED | OUTPATIENT
Start: 2022-06-19 | End: 2022-06-23 | Stop reason: HOSPADM

## 2022-06-19 RX ORDER — ONDANSETRON 4 MG/1
4 TABLET, ORALLY DISINTEGRATING ORAL EVERY 8 HOURS PRN
Status: DISCONTINUED | OUTPATIENT
Start: 2022-06-19 | End: 2022-06-23 | Stop reason: HOSPADM

## 2022-06-19 RX ORDER — METHOCARBAMOL 500 MG/1
1000 TABLET, FILM COATED ORAL 4 TIMES DAILY
Status: DISCONTINUED | OUTPATIENT
Start: 2022-06-19 | End: 2022-06-23 | Stop reason: HOSPADM

## 2022-06-19 RX ORDER — SODIUM CHLORIDE 9 MG/ML
INJECTION, SOLUTION INTRAVENOUS PRN
Status: DISCONTINUED | OUTPATIENT
Start: 2022-06-19 | End: 2022-06-23 | Stop reason: HOSPADM

## 2022-06-19 RX ORDER — DEXAMETHASONE SODIUM PHOSPHATE 10 MG/ML
10 INJECTION INTRAMUSCULAR; INTRAVENOUS ONCE
Status: COMPLETED | OUTPATIENT
Start: 2022-06-19 | End: 2022-06-19

## 2022-06-19 RX ORDER — ONDANSETRON 2 MG/ML
4 INJECTION INTRAMUSCULAR; INTRAVENOUS EVERY 6 HOURS PRN
Status: DISCONTINUED | OUTPATIENT
Start: 2022-06-19 | End: 2022-06-23 | Stop reason: HOSPADM

## 2022-06-19 RX ORDER — PANTOPRAZOLE SODIUM 40 MG/1
40 TABLET, DELAYED RELEASE ORAL
Status: DISCONTINUED | OUTPATIENT
Start: 2022-06-19 | End: 2022-06-20 | Stop reason: SDUPTHER

## 2022-06-19 RX ORDER — LABETALOL HYDROCHLORIDE 5 MG/ML
10 INJECTION, SOLUTION INTRAVENOUS ONCE
Status: COMPLETED | OUTPATIENT
Start: 2022-06-19 | End: 2022-06-19

## 2022-06-19 RX ORDER — OXYCODONE HYDROCHLORIDE 5 MG/1
5 TABLET ORAL EVERY 4 HOURS PRN
Status: DISCONTINUED | OUTPATIENT
Start: 2022-06-19 | End: 2022-06-20

## 2022-06-19 RX ORDER — SODIUM CHLORIDE 0.9 % (FLUSH) 0.9 %
10 SYRINGE (ML) INJECTION PRN
Status: DISCONTINUED | OUTPATIENT
Start: 2022-06-19 | End: 2022-06-23 | Stop reason: HOSPADM

## 2022-06-19 RX ORDER — WARFARIN SODIUM 2 MG/1
2 TABLET ORAL
Status: ON HOLD | COMMUNITY
End: 2022-06-22 | Stop reason: HOSPADM

## 2022-06-19 RX ORDER — PANTOPRAZOLE SODIUM 20 MG/1
20 TABLET, DELAYED RELEASE ORAL DAILY
Status: ON HOLD | COMMUNITY
End: 2022-06-22 | Stop reason: HOSPADM

## 2022-06-19 RX ADMIN — LABETALOL HYDROCHLORIDE 10 MG: 5 INJECTION INTRAVENOUS at 06:08

## 2022-06-19 RX ADMIN — METHOCARBAMOL 1000 MG: 500 TABLET ORAL at 21:16

## 2022-06-19 RX ADMIN — ACETAMINOPHEN 650 MG: 325 TABLET, FILM COATED ORAL at 23:21

## 2022-06-19 RX ADMIN — MORPHINE SULFATE 2 MG: 2 INJECTION, SOLUTION INTRAMUSCULAR; INTRAVENOUS at 06:09

## 2022-06-19 RX ADMIN — METOPROLOL TARTRATE 25 MG: 25 TABLET, FILM COATED ORAL at 21:15

## 2022-06-19 RX ADMIN — SODIUM CHLORIDE, PRESERVATIVE FREE 10 ML: 5 INJECTION INTRAVENOUS at 21:16

## 2022-06-19 RX ADMIN — DEXAMETHASONE SODIUM PHOSPHATE 10 MG: 10 INJECTION INTRAMUSCULAR; INTRAVENOUS at 06:10

## 2022-06-19 RX ADMIN — SODIUM CHLORIDE: 9 INJECTION, SOLUTION INTRAVENOUS at 19:15

## 2022-06-19 ASSESSMENT — ENCOUNTER SYMPTOMS
BACK PAIN: 1
SORE THROAT: 0
ABDOMINAL DISTENTION: 0
ABDOMINAL PAIN: 0
VOMITING: 0
CHEST TIGHTNESS: 0
COUGH: 0
SHORTNESS OF BREATH: 0
NAUSEA: 0
DIARRHEA: 0

## 2022-06-19 ASSESSMENT — PAIN SCALES - GENERAL
PAINLEVEL_OUTOF10: 0
PAINLEVEL_OUTOF10: 0

## 2022-06-19 NOTE — ED PROVIDER NOTES
ED  Provider Note  Admit Date/RoomTime: 6/19/2022 12:02 PM  ED Room: 08/08      History of Present Illness:  6/19/22, Time: 12:24 PM EDT  Chief Complaint   Patient presents with   [address] Consultation     transfer from [facility]; T12 burst fx; from home-fell in kitchen          Hitesh Mccord is a 80 y.o. female presenting to the ED for fall, transfer from [facility]. Onset: sudden   Timing: one episode    Duration: seconds  Associated symptoms: fall in kitchen, no prodrome, able to get up and ambulate after the event, mild pain in back, no other pain complaints. Found to have compression/burst fx T12. No pain currently, denies weakness or numbness. Severity: no pain currently   Exacerbated by: none   Relieved by: none         Review of Systems:   A complete review of systems was performed and pertinent positives and negatives are stated within HPI, all other systems reviewed and are negative.        --------------------------------------------- PATIENT HISTORY--------------------------------------------  Past Medical History:  has a past medical history of Hypertension, Osteoporosis, and TIA (transient ischemic attack). Past Surgical History:  has a past surgical history that includes Appendectomy and HEMIARTHROPLASTY HIP (Left, 2/26/2019). Family History: family history is not on file. . Unless otherwise noted, family history is non contributory    Social History:  reports that she has never smoked. She has never used smokeless tobacco. She reports that she does not drink alcohol and does not use drugs. The patients home medications have been reviewed. Allergies: Patient has no known allergies.     I have reviewed the past medical history, past surgical history, social history, and family history    ---------------------------------------------------PHYSICAL EXAM--------------------------------------    Constitutional/General: Alert and oriented x3  Head: Normocephalic and atraumatic  Eyes: PERRL, EOMI, sclera non icteric  ENT: Oropharynx clear, handling secretions, no trismus  Neck: Supple, full ROM, no stridor, no meningismus  Respiratory: lctab  Cardiovascular: RRR, no R/G/M, 2+ peripheral pulses  Chest: No chest wall tenderness, equal chest rise  Gastrointestinal:  sntnd  Musculoskeletal: Extremities warm and well perfused, moving all extremities  Skin: skin warm and dry. No rashes. Neurologic: No focal deficits, strength and sensation grossly intact   Psychiatric: Normal Affect, behavior normal           -------------------------------------------------- RESULTS -------------------------------------------------  I have personally reviewed all laboratory and imaging results for this patient. Results are listed below. LABS: (Lab results interpreted by me)  No results found for this visit on 06/19/22.,       RADIOLOGY:  Imaging interpreted by Radiologist unless otherwise specified  No orders to display       ------------------------- NURSING NOTES AND VITALS REVIEWED ---------------------------  The nursing notes within the ED encounter and vital signs as below have been reviewed by myself  BP (!) 166/74   Pulse 78   Temp 97.6 °F (36.4 °C) (Oral)   Resp 19   Ht 5' 5\" (1.651 m)   Wt 108 lb (49 kg)   SpO2 95%   BMI 17.97 kg/m²      The patients available past medical records and past encounters were reviewed. ------------------------------ ED COURSE/MEDICAL DECISION MAKING----------------------  Medications - No data to display    I, Dr. Steve Mcnulty, am the primary provider of record    Medical Decision Making:   Trauma transfer. EMIGDIO Belle aware and requested transfer for trauma consult. Neurologically intact. Trauma consult. ED Counseling: This emergency provider has spoken with the patient and any family present to discuss clinical status, results, plan of care, diagnosis and prognosis as able to be determined at this time.  Any questions were answered and patient and/or family/POA are agreeable with the plan.       --------------------------------- IMPRESSION AND DISPOSITION ---------------------------------    IMPRESSION  1. Closed fracture of twelfth thoracic vertebra, unspecified fracture morphology[facility]        DISPOSITION  Disposition: Admit to telemetry  Patient condition is good      This report was transcribed using voice recognition software. Every effort was made to ensure accuracy; however, transcription errors may be present.          Jalyn Astorga MD  06/19/22 6889

## 2022-06-19 NOTE — ED PROVIDER NOTES
HPI     Patient is a 80 y.o. female presents with a chief complaint of fall  This has been occurring for a few hours. Patient states that it gets better with nothing. Patient states that it gets worse with time. Patient states that it is moderate in severity. Patient states it was acute in onset. Patient was walking in the kitchen earlier today and fell. Patient is unsure why she fell. Patient landed on the side of her head. Patient did not lose consciousness. Patient denied any chest pain or shortness of breath prior to the fall. Patient states that she has had pain in back pain. Patient states that she was able to ambulate afterwards but then had worsening hip pain. Patient denies any changes in urinary or bowel habits. Review of Systems   Constitutional: Negative for activity change, appetite change, chills, fatigue and fever. HENT: Negative for congestion, drooling and sore throat. Respiratory: Negative for cough, chest tightness and shortness of breath. Cardiovascular: Negative for chest pain and palpitations. Gastrointestinal: Negative for abdominal distention, abdominal pain, diarrhea, nausea and vomiting. Genitourinary: Negative for decreased urine volume, difficulty urinating, enuresis, flank pain, frequency and hematuria. Musculoskeletal: Positive for back pain, gait problem and myalgias. Negative for arthralgias and neck stiffness. Skin: Negative for rash and wound. Neurological: Negative for dizziness, facial asymmetry, light-headedness and headaches. Psychiatric/Behavioral: Negative for agitation, confusion and decreased concentration. Physical Exam  Vitals and nursing note reviewed. Constitutional:       Appearance: She is well-developed. HENT:      Head: Normocephalic and atraumatic. Eyes:      Conjunctiva/sclera: Conjunctivae normal.   Cardiovascular:      Rate and Rhythm: Normal rate and regular rhythm. Heart sounds: Normal heart sounds.  No murmur heard. Pulmonary:      Effort: Pulmonary effort is normal. No respiratory distress. Breath sounds: Normal breath sounds. No wheezing or rales. Abdominal:      General: Bowel sounds are normal.      Palpations: Abdomen is soft. Tenderness: There is no abdominal tenderness. There is no guarding or rebound. Musculoskeletal:         General: Tenderness present. No swelling, deformity or signs of injury. Cervical back: Normal range of motion and neck supple. Comments: Tenderness palpation of the right temple and right lateral neck. Skin:     General: Skin is warm and dry. Comments: Pulses in the bilateral lower extremities. Neurological:      Mental Status: She is alert and oriented to person, place, and time. Cranial Nerves: No cranial nerve deficit. Coordination: Coordination normal.          Procedures     [facility]     ED Course as of 06/19/22 0606   Sun Jun 19, 2022   0602 Case reviewed with neurosurgery dr Zara Morris, who notes transfer pt for evaluation [RAINA]   22 [id number] Discussed case with attending physician in the emergency room Dr. Shayla Porter who agreed to have patient transferred ER to ER. [JM]      ED Course User Index  [RAINA] Ayo Jack DO  [JM] Hailey Street MD      Patient is a 80 y.o. female presenting with back pain. Patient had a fall earlier today. Patient stated that she fell on her head. Unknown why patient fell. Patient had a EKG ordered. Patient's labs are benign. Patient had no neurological dysfunction. Patient had no saddle anesthesia. No changes in urinary or bowel habits. Patient was noted to have a possible T12 burst fracture. Discussed case with neurosurgery and patient be transferred downtown. Patient takes no blood thinners. Discussed case with emergency room physician and patient be transferred. Patient and patient's  were informed of this and are agreeable to this plan.         Patient was seen and evaluated by myself and my attending Frederic Perez DO. Assessment and Plan discussed with attending provider, please see attestation for final plan of care. This note was done using dictation software and there may be some grammatical errors associated with this. Sean Vargas MD     ED Course as of 06/19/22 2907   Evaristo Medley Jun 19, 2022   0602 Case reviewed with neurosurgery dr Governor Purcell, who notes transfer pt for evaluation [RAINA]   22 [id number] Discussed case with attending physician in the emergency room Dr. Yesica Verduzco who agreed to have patient transferred ER to ER. [JM]      ED Course User Index  [RAINA] Frederic Perez DO  [JM] Sean Vargas MD       --------------------------------------------- PAST HISTORY ---------------------------------------------  Past Medical History:  has a past medical history of Hypertension, Osteoporosis, and TIA (transient ischemic attack). Past Surgical History:  has a past surgical history that includes Appendectomy and HEMIARTHROPLASTY HIP (Left, 2/26/2019). Social History:  reports that she has never smoked. She has never used smokeless tobacco. She reports that she does not drink alcohol and does not use drugs. Family History: family history is not on file. The patients home medications have been reviewed. Allergies: Patient has no known allergies.     -------------------------------------------------- RESULTS -------------------------------------------------    Lab  Results for orders placed or performed during the hospital encounter of 06/19/22   CBC with Auto Differential   Result Value Ref Range    WBC 9.8 4.5 - 11.5 E9/L    RBC 4.55 3.50 - 5.50 E12/L    Hemoglobin 13.6 11.5 - 15.5 g/dL    Hematocrit 39.8 34.0 - 48.0 %    MCV 87.5 80.0 - 99.9 fL    MCH 29.9 26.0 - 35.0 pg    MCHC 34.2 32.0 - 34.5 %    RDW 12.1 11.5 - 15.0 fL    Platelets 572 766 - 699 E9/L    MPV 9.2 7.0 - 12.0 fL    Neutrophils % 79.4 43.0 - 80.0 %    Immature Granulocytes % 0.7 0.0 - 5.0 %    Lymphocytes % 7.1 (L) 20.0 - 42.0 %    Monocytes % 12.4 (H) 2.0 - 12.0 %    Eosinophils % 0.2 0.0 - 6.0 %    Basophils % 0.2 0.0 - 2.0 %    Neutrophils Absolute 7.79 (H) 1.80 - 7.30 E9/L    Immature Granulocytes # 0.07 E9/L    Lymphocytes Absolute 0.70 (L) 1.50 - 4.00 E9/L    Monocytes Absolute 1.22 (H) 0.10 - 0.95 E9/L    Eosinophils Absolute 0.02 (L) 0.05 - 0.50 E9/L    Basophils Absolute 0.02 0.00 - 0.20 E9/L   Comprehensive Metabolic Panel w/ Reflex to MG   Result Value Ref Range    Sodium 133 132 - 146 mmol/L    Potassium reflex Magnesium 3.7 3.5 - 5.0 mmol/L    Chloride 94 (L) 98 - 107 mmol/L    CO2 24 22 - 29 mmol/L    Anion Gap 15 7 - 16 mmol/L    Glucose 130 (H) 74 - 99 mg/dL    BUN 10 6 - 23 mg/dL    CREATININE 0.6 0.5 - 1.0 mg/dL    GFR Non-African American >60 >=60 mL/min/1.73    GFR African American >60     Calcium 9.5 8.6 - 10.2 mg/dL    Total Protein 7.0 6.4 - 8.3 g/dL    Albumin 4.1 3.5 - 5.2 g/dL    Total Bilirubin 0.8 0.0 - 1.2 mg/dL    Alkaline Phosphatase 61 35 - 104 U/L    ALT 12 0 - 32 U/L    AST 22 0 - 31 U/L   Troponin   Result Value Ref Range    Troponin, High Sensitivity 13 (H) 0 - 9 ng/L   Urinalysis with Microscopic   Result Value Ref Range    Color, UA Yellow Straw/Yellow    Clarity, UA Clear Clear    Glucose, Ur Negative Negative mg/dL    Bilirubin Urine Negative Negative    Ketones, Urine 15 (A) Negative mg/dL    Specific Gravity, UA 1.010 1.005 - 1.030    Blood, Urine MODERATE (A) Negative    pH, UA 7.5 5.0 - 9.0    Protein, UA Negative Negative mg/dL    Urobilinogen, Urine 0.2 <2.0 E.U./dL    Nitrite, Urine Negative Negative    Leukocyte Esterase, Urine Negative Negative    WBC, UA 1-3 0 - 5 /HPF    RBC, UA 2-5 0 - 2 /HPF    Epithelial Cells, UA RARE /HPF    Bacteria, UA RARE (A) None Seen /HPF       Radiology  CT LUMBAR SPINE WO CONTRAST   Final Result   Acute compression/burst fracture involving the T12 vertebral body, with   greater than 60-70% height loss and mild osseous retropulsion of fracture fragments. No subluxation. No fracture or subluxation involving the lumbar spine. Mild anterolisthesis   of L4 on L5, likely degenerative. CT Head WO Contrast   Final Result   No acute findings. Atrophy and chronic microvascular ischemic changes. CT Cervical Spine WO Contrast   Final Result   No acute findings. Multilevel degenerative changes. Atherosclerotic   calcifications of the carotid arteries noted. Right apical pleural   thickening. The visualized lung apices are otherwise normal.         CT ABDOMEN PELVIS WO CONTRAST Additional Contrast? None   Final Result   Findings suspicious for an acute compression fracture involving the T12   vertebral body, with greater than 60-70% height loss. No subluxation. Colonic diverticulosis, without diverticulitis. Moderate stool within the   right colon and cecum. XR CHEST PORTABLE    (Results Pending)   XR HIP 2-3 VW W PELVIS LEFT    (Results Pending)           ------------------------- NURSING NOTES AND VITALS REVIEWED ---------------------------  Date / Time Roomed:  6/19/2022  3:29 AM  ED Bed Assignment:  19/19    The nursing notes within the ED encounter and vital signs as below have been reviewed. Patient Vitals for the past 24 hrs:   BP Temp Temp src Pulse Resp SpO2   06/19/22 0420 (!) 215/103 98.6 °F (37 °C) Oral 83 14 94 %       Oxygen Saturation Interpretation: Normal      ------------------------------------------ PROGRESS NOTES ------------------------------------------  Re-evaluation(s):    Patients symptoms show no change  Repeat physical examination is not changed      I have spoken with the patient and discussed todays results, in addition to providing specific details for the plan of care and counseling regarding the diagnosis and prognosis. Their questions are answered at this time and they are agreeable with the plan.   I have discussed the risks and benefits of transfer and they wish to proceed with the transfer. --------------------------------- ADDITIONAL PROVIDER NOTES ---------------------------------  Consultations:  Spoke with Dr. Venita Yadav (ER). Discussed case. They will come to the ED to evaluate this patient. Spoke with Dr. Tiff Joya (Neurosurgery). Discussed case. They will provide consultation. Reason for transfer: higher level of care    This patient's ED course included: a personal history and physicial examination, re-evaluation prior to disposition, multiple bedside re-evaluations, IV medications, cardiac monitoring and continuous pulse oximetry    This patient has remained hemodynamically stable during their ED course. Please note that the withdrawal or failure to initiate urgent interventions for this patient would likely result in a life threatening deterioration or permanent disability. Accordingly this patient received 32 minutes of critical care time, excluding separately billable procedures. Clinical Impression  1. Compression fracture of T12 vertebra, initial encounter ([facility]    2. Fall, initial encounter          Disposition  Patient's disposition: Transfer to [facility]  Transferred by: ground.   Patient's condition is critical.         Fabricio Pablo MD  Resident  06/19/22 5485

## 2022-06-19 NOTE — ED NOTES
Patient placed onto monitor. Patient's  Jero Samuels called per request for an update.      Kirsten Rivers RN  06/19/22 4543

## 2022-06-19 NOTE — LETTER
[address] [facility] Encounter Date/Time: 2022 Fernando Patel Account: [de-identified]    MRN: [id number]    Patient: Jana Stephenson    Contact Serial #: [id number]      ENCOUNTER          Patient Class: I Private Enc? No Unit RM BD: [address] Service: FANNY   Encounter DX: Trauma [T14.90XA]   ADM Provider: Marta Umanzor MD   Procedure:     ATT Provider: Marta Umanzor MD   REF Provider:        Admission DX: Trauma, Closed fracture of twelfth thoracic vertebra, unspecified fracture morphology, initial encounter [facility]) and DX codes: T14.90XA, S22.089A      PATIENT                 Name: Jana Stephenson : 1934 (88 yrs)   Address: [address] [place] [address] Sex: Female   City: [address]         Marital Status:    Employer: RETIRED         Adventism: [address]   Primary Care Provider: Bonnie Byrnes MD         Primary Phone: [phone]   EMERGENCY CONTACT   Contact Name Legal Guardian? Relationship to Patient Home Phone Work Phone   1. Roderick Rosas  2. Janes Rosas      Spouse  Child [phone]              GUARANTOR            Guarantor: Jana Stephenson     : 1934   Address: [place] [address] Sex: Female   [address] [id number]     Relation to Patient: Self       Home Phone: [phone]   Guarantor ID: [id number]       Work Phone:     Guarantor Employer: RETIRED         Status: RETIRED      COVERAGE        PRIMARY INSURANCE   Payor: MEDICARE Plan: MEDICARE PART A AND B   Payor Address: [facility],  [facility], [place] [address]       Group Number:   Insurance Type: INDEMNITY   Subscriber Name: Jesus Israel : 1934   Subscriber ID: [id number] Pat. Rel. to Sub: Self   SECONDARY INSURANCE   Payor: Acacia Perez: [address] [place] [address]   Payor Address:  [facility] [id number], [address] [place] [address], [address]          Group Number:    [address] Medicaid  CERTIFICATION OF NECESSITY  FOR TRANSPORTATION   BY WHEELCHAIR VAN     Individual Information   1. Name: Justo Rosas 2. [place] Medicaid Billing Number:    3. Address:  [facility] Provider Information   4. Provider Name:    5. [place] Medicaid Provider Number:  National Provider Identifier (NPI):      Certification  7. Criteria:  By signing this document, the practitioner certifies that two statements are true:  A. This individual must be accompanied by a mobility-related assistive device from the point of pick-up to the point of drop-off. B. Transport of this individual by standard passenger vehicle or common carrier is precluded or contraindicated. 8. Period Beginning Date:    9. Length  [x] Not more than 5 day(s)  [] One Year     Additional Information Relevant to Certification   10. Comments or Explanations, If Necessary or Appropriate     TLSO brace, T76 fx     Certifying Practitioner Information      12. [place] Medicaid Provider Number, If Applicable:  [address] Provider Identifier (NPI):      Signature Information   14. Date of Signature:  15. Name of Person Signing: Tatum STEEN   16. Signature and Professional Designation: Electronically signed by MONTANA Odom LSW on 2022 at 10:47 AM       [facility] [address]  Rev. 2015 [id number] Insurance Type: [address] Name: Shiva Hoang : 1934   Subscriber ID: [id number] Pat.  Rel. to Sub: SELF           CSN: [id number]

## 2022-06-19 NOTE — H&P
TRAUMA HISTORY & PHYSICAL  Surgical Resident/Advance Practice Nurse  6/19/2022  1:40 PM    PRIMARY SURVEY    CHIEF COMPLAINT:  Trauma consult. Injury occurred yesterday. Patient had a mechanical fall from standing. She presented to [facility] where she underwent pan scan imaging as well as CT lumbar, CXR, x-ray hips. She was found to have a T12 burst fracture. She is GCS 14 (confusion). She is on Eliquis for Afib.     AIRWAY:   Airway Normal  EMS ETT Absent  Noisy respirations Absent  Retractions: Absent  Vomiting/bleeding: Absent      BREATHING:    Midaxillary breath sound left:  Normal  Midaxillary breath sound right:  Normal    Cough sound intensity:  good   FiO2:  Room air    SMI unreliable      CIRCULATION:   Femerol pulse intensity: Strong  Palpebral conjunctiva: Pink     Vitals:    06/19/22 1249   BP: (!) 166/74   Pulse: 78   Resp: 19   Temp:    SpO2: 95%       Vitals:    06/19/22 1203 06/19/22 1249   BP: (!) 164/63 (!) 166/74   Pulse: 80 78   Resp: 16 19   Temp: 97.6 °F (36.4 °C)    TempSrc: Oral    SpO2: 98% 95%   Weight: 108 lb (49 kg)    Height: 5' 5\" (1.651 m)         FAST EXAM: Deferred    Central Nervous System    GCS Initial 15 minutes   Eye  Motor  Verbal 4 - Opens eyes on own  6 - Follows simple motor commands  4 - Seems confused, disoriented 4 - Opens eyes on own  6 - Follows simple motor commands  4 - Seems confused, disoriented     Neuromuscular blockade: No  Pupil size:  Left 4 mm    Right 4 mm  Pupil reaction: Yes    Wiggles fingers: Left Yes Right Yes  Wiggles toes: Left Yes   Right Yes    Hand grasp:   Left  Present      Right  Present  Plantar flexion: Left  Present      Right   Present    Loss of consciousness:  No    History Obtained From:  Patient & chart [facility]  Private Medical Doctor: Nafisa Young MD    Pre-exisiting Medical History:  yes    Conditions:   Past Medical History:   Diagnosis Date    Hypertension     Osteoporosis     TIA (transient ischemic attack) Medications:   No current facility-administered medications on file prior to encounter. Current Outpatient Medications on File Prior to Encounter   Medication Sig Dispense Refill    docusate sodium (COLACE, DULCOLAX) 100 MG CAPS Take 100 mg by mouth daily      vitamin D (ERGOCALCIFEROL) 25767 units capsule Take 1 capsule by mouth once for 1 dose 1 capsule 0    apixaban (ELIQUIS) 2.5 MG TABS tablet Take 1 tablet by mouth 2 times daily 60 tablet     metoprolol tartrate (LOPRESSOR) 25 MG tablet Take 1 tablet by mouth 2 times daily 60 tablet 3    Mirabegron ER (MYRBETRIQ) 25 MG TB24 Take by mouth every 48 hours as needed       omeprazole (PRILOSEC) 10 MG delayed release capsule Take 10 mg by mouth daily       Allergies: No Known Allergies    Social History:   Tobacco use:  none  Alcohol use:  none  Illicit drug use:  no history of illicit drug use    Past Surgical History:    Past Surgical History:   Procedure Laterality Date    APPENDECTOMY      HEMIARTHROPLASTY HIP Left 2/26/2019    LEFT  HIP HEMIARTHROPLASTY (MARTA) performed by Dacia Rashid MD at [facility] OR       Anticoagulant use: Apixaban (Eliquis)  Antiplatelet use:   None    NSAID use in last 72 hours: unknown  Taken PCN in past:  yes  Last food/drink: yesterday  Last tetanus: unknown- patient does not want    Family History:   No family history of anesthesia complications    Complaints:   Head:  None  Neck:   None  Chest:   None  Back:   None  Abdomen:   None  Extremities:   None  Comments: none    Review of systems:  All negative unless otherwise noted. SECONDARY SURVEY  Head/scalp: Atraumatic    Face: Atraumatic    Eyes/ears/nose: Atraumatic    Pharynx/mouth: Atraumatic    Neck: Atraumatic     Cervical spine tenderness:   Cervical collar not indicated  Pain:  none  ROM:  Not indicated    Chest wall:  Atraumatic    Heart:  Regular rate & rhythm    Abdomen: Atraumatic.  Soft ND  Tenderness:  none    Pelvis: Atraumatic  Tenderness: none    Thoracolumbar spine: Atraumatic  Tenderness:  none    Genitourinary:  Atraumatic. No blood or urine noted    Rectum: Atraumatic. No blood noted. Perineum: Atraumatic. No blood or urine noted. Extremities:   Sensory normal  Motor normal    Distal Pulses  Left arm normal  Right arm normal  Left leg normal  Right leg normal    Capillary refill  Left arm normal  Right arm normal  Left leg normal  Right leg normal    Procedures in ED:  none    In the event of Emergency Blood Transfusion:  Due to the critical condition of this patient, I request the immediate release of blood products for emergency transfusion secondary to shock. I understand the increased risks incurred by the lack of complete transfusion testing.       Radiology: Chest Xray , CT Head , CT Cervical spine , CT Chest , CT Abdomen  and CT Lumbar  Xray hip    Consultations:  Neurosurgery    Admission/Diagnosis:  T12 burst fx    Plan of Treatment:  Admit general floor  NSY recommendations  Pain/nausea control prn    Plan discussed with Dr. Gustavo Bowen at 6/19/2022 on 1:40 PM    Electronically signed by Jayne Alvarez MD on 6/19/2022 at 1:40 PM

## 2022-06-19 NOTE — PLAN OF CARE
Problem: Discharge Planning  Goal: Discharge to home or other facility with appropriate resources  Outcome: Progressing     Problem: Skin/Tissue Integrity  Goal: Absence of new skin breakdown  Description: 1. Monitor for areas of redness and/or skin breakdown  2. Assess vascular access sites hourly  3. Every 4-6 hours minimum:  Change oxygen saturation probe site  4. Every 4-6 hours:  If on nasal continuous positive airway pressure, respiratory therapy assess nares and determine need for appliance change or resting period.   Outcome: Completed

## 2022-06-20 ENCOUNTER — APPOINTMENT (OUTPATIENT)
Dept: MRI IMAGING | Age: 87
DRG: 552 | End: 2022-06-20
Payer: MEDICARE

## 2022-06-20 LAB
ANION GAP SERPL CALCULATED.3IONS-SCNC: 11 MMOL/L (ref 7–16)
BASOPHILS ABSOLUTE: 0.02 E9/L (ref 0–0.2)
BASOPHILS RELATIVE PERCENT: 0.2 % (ref 0–2)
BUN BLDV-MCNC: 17 MG/DL (ref 6–23)
BURR CELLS: ABNORMAL
CALCIUM SERPL-MCNC: 9.6 MG/DL (ref 8.6–10.2)
CHLORIDE BLD-SCNC: 99 MMOL/L (ref 98–107)
CO2: 25 MMOL/L (ref 22–29)
CREAT SERPL-MCNC: 0.5 MG/DL (ref 0.5–1)
EOSINOPHILS ABSOLUTE: 0 E9/L (ref 0.05–0.5)
EOSINOPHILS RELATIVE PERCENT: 0 % (ref 0–6)
GFR AFRICAN AMERICAN: >60
GFR NON-AFRICAN AMERICAN: >60 ML/MIN/1.73
GLUCOSE BLD-MCNC: 107 MG/DL (ref 74–99)
HCT VFR BLD CALC: 38.6 % (ref 34–48)
HEMOGLOBIN: 13 G/DL (ref 11.5–15.5)
IMMATURE GRANULOCYTES #: 0.08 E9/L
IMMATURE GRANULOCYTES %: 0.7 % (ref 0–5)
LYMPHOCYTES ABSOLUTE: 0.51 E9/L (ref 1.5–4)
LYMPHOCYTES RELATIVE PERCENT: 4.8 % (ref 20–42)
MCH RBC QN AUTO: 29.5 PG (ref 26–35)
MCHC RBC AUTO-ENTMCNC: 33.7 % (ref 32–34.5)
MCV RBC AUTO: 87.7 FL (ref 80–99.9)
MONOCYTES ABSOLUTE: 1.38 E9/L (ref 0.1–0.95)
MONOCYTES RELATIVE PERCENT: 12.9 % (ref 2–12)
NEUTROPHILS ABSOLUTE: 8.73 E9/L (ref 1.8–7.3)
NEUTROPHILS RELATIVE PERCENT: 81.4 % (ref 43–80)
PDW BLD-RTO: 12.1 FL (ref 11.5–15)
PLATELET # BLD: 219 E9/L (ref 130–450)
PMV BLD AUTO: 10.1 FL (ref 7–12)
POIKILOCYTES: ABNORMAL
POLYCHROMASIA: ABNORMAL
POTASSIUM REFLEX MAGNESIUM: 4 MMOL/L (ref 3.5–5)
RBC # BLD: 4.4 E12/L (ref 3.5–5.5)
SODIUM BLD-SCNC: 135 MMOL/L (ref 132–146)
WBC # BLD: 10.7 E9/L (ref 4.5–11.5)

## 2022-06-20 PROCEDURE — 72148 MRI LUMBAR SPINE W/O DYE: CPT

## 2022-06-20 PROCEDURE — 6370000000 HC RX 637 (ALT 250 FOR IP): Performed by: STUDENT IN AN ORGANIZED HEALTH CARE EDUCATION/TRAINING PROGRAM

## 2022-06-20 PROCEDURE — 99232 SBSQ HOSP IP/OBS MODERATE 35: CPT | Performed by: SURGERY

## 2022-06-20 PROCEDURE — 36415 COLL VENOUS BLD VENIPUNCTURE: CPT

## 2022-06-20 PROCEDURE — 85025 COMPLETE CBC W/AUTO DIFF WBC: CPT

## 2022-06-20 PROCEDURE — 80048 BASIC METABOLIC PNL TOTAL CA: CPT

## 2022-06-20 PROCEDURE — 6370000000 HC RX 637 (ALT 250 FOR IP): Performed by: SURGERY

## 2022-06-20 PROCEDURE — 1200000000 HC SEMI PRIVATE

## 2022-06-20 RX ORDER — DIVALPROEX SODIUM 125 MG/1
125 CAPSULE, COATED PELLETS ORAL EVERY 12 HOURS SCHEDULED
Status: DISCONTINUED | OUTPATIENT
Start: 2022-06-20 | End: 2022-06-23 | Stop reason: HOSPADM

## 2022-06-20 RX ORDER — HYDRALAZINE HYDROCHLORIDE 20 MG/ML
10 INJECTION INTRAMUSCULAR; INTRAVENOUS EVERY 30 MIN PRN
Status: DISCONTINUED | OUTPATIENT
Start: 2022-06-20 | End: 2022-06-23 | Stop reason: HOSPADM

## 2022-06-20 RX ORDER — PANTOPRAZOLE SODIUM 20 MG/1
20 TABLET, DELAYED RELEASE ORAL
Status: DISCONTINUED | OUTPATIENT
Start: 2022-06-20 | End: 2022-06-23 | Stop reason: HOSPADM

## 2022-06-20 RX ADMIN — METOPROLOL TARTRATE 25 MG: 25 TABLET, FILM COATED ORAL at 20:51

## 2022-06-20 RX ADMIN — ACETAMINOPHEN 650 MG: 325 TABLET, FILM COATED ORAL at 10:01

## 2022-06-20 RX ADMIN — POLYETHYLENE GLYCOL 3350 17 G: 17 POWDER, FOR SOLUTION ORAL at 10:02

## 2022-06-20 RX ADMIN — DIVALPROEX SODIUM 125 MG: 125 CAPSULE, COATED PELLETS ORAL at 20:51

## 2022-06-20 RX ADMIN — BISACODYL 5 MG: 5 TABLET, COATED ORAL at 10:01

## 2022-06-20 RX ADMIN — METOPROLOL TARTRATE 25 MG: 25 TABLET, FILM COATED ORAL at 10:01

## 2022-06-20 RX ADMIN — ACETAMINOPHEN 650 MG: 325 TABLET, FILM COATED ORAL at 23:51

## 2022-06-20 RX ADMIN — METHOCARBAMOL 1000 MG: 500 TABLET ORAL at 10:02

## 2022-06-20 ASSESSMENT — PAIN DESCRIPTION - DESCRIPTORS: DESCRIPTORS: ACHING;SORE;DISCOMFORT

## 2022-06-20 ASSESSMENT — PAIN DESCRIPTION - LOCATION: LOCATION: BACK

## 2022-06-20 ASSESSMENT — PAIN SCALES - GENERAL
PAINLEVEL_OUTOF10: 3
PAINLEVEL_OUTOF10: 3

## 2022-06-20 ASSESSMENT — PAIN DESCRIPTION - ORIENTATION: ORIENTATION: MID

## 2022-06-20 ASSESSMENT — PAIN - FUNCTIONAL ASSESSMENT: PAIN_FUNCTIONAL_ASSESSMENT: PREVENTS OR INTERFERES SOME ACTIVE ACTIVITIES AND ADLS

## 2022-06-20 NOTE — PROGRESS NOTES
OT consult received and appreciated. Chart reviewed. Will hold evaluation due to MRI ordered and will follow up with TLSO brace  . Will evaluate at a later time when TLSO is delivered. Thank you. Shiloh Childs.  Flaco [address], Nataliya [address]

## 2022-06-20 NOTE — CONSULTS
[address]                  [address] [id number],  [facility]                                  CONSULTATION    PATIENT NAME: Jake Campbell                  :        1934  MED REC NO:   [id number]                            ROOM:       5208  ACCOUNT NO:   [de-identified]                           ADMIT DATE: 2022  PROVIDER:     Kamlesh Villaseñor MD    CONSULT DATE:  2022    REASON FOR CONSULTATION:  T12 compression fracture. HISTORY OF PRESENT ILLNESS:  The patient is an 79-year-old lady who  apparently fell in the first week of 2022. She was not really having  any significant pain back then; however, she fell again more recently  and since then, she has had worsening back pain that she states is about  currently a 5/10, it is worse with activity and better with rest.  She  denies any numbness, tingling, or loss of control of bowel or bladder  function. She does have some right lower extremity weakness. She  describes the pain as a combination of stabbing, cramping, and aching,  and she subsequently was seen at [address] [place] [address] where she had  a CT scan of her lumbar spine that showed a T12 compression fracture. She was transferred to [facility] and Neurosurgery Service was  consulted. PAST MEDICAL HISTORY:  Positive for hypertension, osteoporosis, and TIA. PAST SURGICAL HISTORY:  Positive for appendectomy and left chris hip  arthroplasty. SOCIAL HISTORY:  Negative for tobacco or alcohol use. FAMILY HISTORY:  Noncontributory. HOME MEDICATIONS:  Include Lopressor. REVIEW OF SYSTEMS:  HEENT:  Negative for headache, double vision, or  blurry vision. CARDIOVASCULAR:  Negative for chest pain, arrhythmia, or  palpitations. RESPIRATORY:  Negative for shortness of breath, asthma,  bronchitis, or pneumonia. GASTROINTESTINAL:  Negative for heartburn,  nausea, vomiting, diarrhea, or constipation.   GENITOURINARY: Negative  for hematuria or dysuria. HEMATOLOGIC:  Positive for easy bruising. INFECTIOUS:  Negative for any recent infection. MUSCULOSKELETAL:   Positive for back pain. PSYCHIATRIC:  Negative for anxiety or  depression. NEUROLOGIC:  Negative for seizure or stroke. ENDOCRINE:   Negative for thyroid disorder or diabetes. PHYSICAL EXAMINATION:  VITAL SIGNS:  She is currently afebrile with a T-current of 36.4 degrees  Celsius, pulse 72, blood pressure is 135/95, respiratory rate is 20. GENERAL:  She is resting in bed. Appears to be in mild-to-moderate  distress secondary to pain. She appears her stated age. HEENT:  Her head is normocephalic and atraumatic. Pupils are 3 to 2 mm  and reactive. She has no drainage out of her eyes, ears, nose, or  throat. SKIN:  Warm and dry. MUSCULOSKELETAL:  She has got good range of motion in her bilateral  upper extremities and in her left lower extremity. She has decreased  range of motion in her right lower extremity. ABDOMEN:  Soft, nontender, nondistended. RESPIRATORY:  She is not using any accessory muscles of respiration. NEUROLOGIC:  On rest of her neurologic exam, she is awake, alert, and  oriented x3. Cranial nerves II through XII are intact bilaterally. Motor exam reveals 4+/5 strength in her bilateral upper extremities,  4-/5 strength in her right lower extremity, and 4+/5 strength in her  left lower extremity. Sensation is grossly intact to light touch. Reflexes are 1+ and symmetric. Toes are going down. LAB VALUES:  Sodium 133, potassium 3.7, chloride 94, CO2 of 24, BUN is  10, creatinine is 0.6. REVIEW OF IMAGING:  She had a CT scan of her lumbar spine that shows T12  compression fracture of indeterminate age. ASSESSMENT AND PLAN:  The patient is an 80-year-old lady with a T12  compression fracture and back pain. She is neurologically stable. Plan  is to obtain an MRI to further help date the fracture to determine the  acuity.   Once the MRI is obtained, we will recommend trying a TLSO brace  and if she is able to tolerate the brace, then we will treat  definitively with the brace, and if she is unable to tolerate the brace,  _____ having way too much pain, we would consider a kyphoplasty if the  fracture is acute.         Cherylene Crome, MD    D: 06/19/2022 17:32:59       T: 06/19/2022 20:08:50     [email or web address]  Job#: [id number]     Doc#: [id number]    CC:

## 2022-06-20 NOTE — DISCHARGE SUMMARY
Physician Discharge Summary     Patient ID:  Kayla Flores  [id number]  80 y.o.  1934    Admit date: 6/19/2022    Discharge date and time: No discharge date for patient encounter. Admitting Physician: Nereida Bonilla MD     Admission Diagnoses: Trauma [T14.90XA]  Closed fracture of twelfth thoracic vertebra, unspecified fracture morphology, initial encounter [facility]) [S27.107W]    Discharge Diagnoses: Principal Problem:    Trauma  Active Problems:    T12 compression fracture ([address]    Closed fracture of twelfth thoracic vertebra (HCC)  Resolved Problems:    * No resolved hospital problems. *      Admission Condition: fair    Discharged Condition: stable    Indication for Admission: Mechanical fall    Hospital Course/Procedures/Operation/treatments:   6/19: Patient suffered mechanical fall from [facility], was found to have T12 burst fx. Per NSG will try TLSO brace. If she is unable to tolerate or has to much pain will discuss surgical intervention. 6/20: Pain in back, well controlled with pain medicines. No neurologic findings. Hypertensive, PRN given. Tertiary otherwise negative  6/21: more confused and having hallucinations overnight. This morning, AO x2, unsure where she was. Denies pain. 6/22: Patient is doing well, worked with PT/OT, 12/24 [facility]. Placement started by case management. [id number] [address] for APPLE. Consults:   IP CONSULT TO TRAUMA SURGERY  IP CONSULT TO NEUROSURGERY  INPATIENT CONSULT TO ORTHOTIST/PROSTHETIST  INPATIENT CONSULT TO ORTHOTIST/PROSTHETIST    Significant Diagnostic Studies:   CT ABDOMEN PELVIS WO CONTRAST Additional Contrast? None    Result Date: 6/19/2022  EXAMINATION: CT OF THE ABDOMEN AND PELVIS WITHOUT CONTRAST 6/19/2022 4:40 am TECHNIQUE: CT of the abdomen and pelvis was performed without the administration of intravenous contrast. Multiplanar reformatted images are provided for review.  Automated exposure control, iterative reconstruction, and/or weight based adjustment of the mA/kV was utilized to reduce the radiation dose to as low as reasonably achievable. COMPARISON: None. HISTORY: ORDERING SYSTEM PROVIDED HISTORY: right sided flank pain TECHNOLOGIST PROVIDED HISTORY: Reason for exam:->right sided flank pain Additional Contrast?->None Decision Support Exception - unselect if not a suspected or confirmed emergency medical condition->Emergency Medical Condition (MA) FINDINGS: Lower Chest: 5 mm nodule within the right lung base. Further evaluation is recommended with a dedicated chest CT. The lung bases are otherwise clear. There is no pleural or pericardial effusion. Organs: Within the limitations of a nonenhanced exam, the liver, spleen, kidneys, adrenal glands and pancreas are normal. GI/Bowel: Colonic diverticulosis, without diverticulitis. Moderate amount of stool is noted within the right colon and cecum. The remainder of the large bowel is normal.  The small bowel loops are not pathologically distended. Evaluation for appendix is limited due to artifact from the patient's hip arthroplasties. Pelvis: The pelvic organs are within normal limits accounting for the limitations. No free fluid or lymphadenopathy. Peritoneum/Retroperitoneum: Atherosclerotic calcifications of the abdominal aorta and its branches, without aneurysm. Bones/Soft Tissues: Bilateral hip arthroplasties are noted. Degenerative changes of the lumbar spine are seen. No discrete lytic or blastic osseous lesions are identified. Grade 1 anterolisthesis of L4 on L5, likely degenerative. Findings suspicious for an acute compression fracture of the T12 vertebral body, with greater than 60-70% height loss. There is no subluxation. Vascular calcifications, without aneurysm. No soft tissue abnormality. Findings suspicious for an acute compression fracture involving the T12 vertebral body, with greater than 60-70% height loss. No subluxation. Colonic diverticulosis, without diverticulitis.   Moderate stool within the right colon and cecum. CT Head WO Contrast    Result Date: 6/19/2022  EXAMINATION: CT OF THE HEAD WITHOUT CONTRAST  6/19/2022 4:40 am TECHNIQUE: CT of the head was performed without the administration of intravenous contrast. Automated exposure control, iterative reconstruction, and/or weight based adjustment of the mA/kV was utilized to reduce the radiation dose to as low as reasonably achievable. COMPARISON: None. HISTORY: ORDERING SYSTEM PROVIDED HISTORY: fall, hit head TECHNOLOGIST PROVIDED HISTORY: Reason for exam:->fall, hit head Has a \"code stroke\" or \"stroke alert\" been called? ->No Decision Support Exception - unselect if not a suspected or confirmed emergency medical condition->Emergency Medical Condition (MA) FINDINGS: BRAIN/VENTRICLES: There is no acute intracranial hemorrhage, mass effect or midline shift. No abnormal extra-axial fluid collection. The gray-white differentiation is maintained without evidence of an acute infarct. There is no evidence of hydrocephalus. Moderate atrophy and chronic microvascular ischemic changes are seen involving the periventricular and subcortical white matter. ORBITS: The visualized portion of the orbits demonstrate no acute abnormality. SINUSES: The visualized paranasal sinuses and mastoid air cells demonstrate no acute abnormality. SOFT TISSUES/SKULL:  No acute abnormality of the visualized skull or soft tissues. No acute findings. Atrophy and chronic microvascular ischemic changes. CT Cervical Spine WO Contrast    Result Date: 6/19/2022  EXAMINATION: CT OF THE CERVICAL SPINE WITHOUT CONTRAST 6/19/2022 4:40 am TECHNIQUE: CT of the cervical spine was performed without the administration of intravenous contrast. Multiplanar reformatted images are provided for review. Automated exposure control, iterative reconstruction, and/or weight based adjustment of the mA/kV was utilized to reduce the radiation dose to as low as reasonably achievable.  COMPARISON: None. HISTORY: ORDERING SYSTEM PROVIDED HISTORY: fall, neck pain TECHNOLOGIST PROVIDED HISTORY: Reason for exam:->fall, neck pain Decision Support Exception - unselect if not a suspected or confirmed emergency medical condition->Emergency Medical Condition (MA) FINDINGS: BONES/ALIGNMENT: Loss of the normal cervical lordosis, which may be secondary to positioning or spasm. No lytic or blastic osseous lesions. No evidence of acute fracture or subluxation. Minimal anterolisthesis of C3 on C4, and minimal retrolisthesis of C4 on C5, likely degenerative. DEGENERATIVE CHANGES: Multilevel degenerative changes are noted, with disc space narrowing, spondylosis, facet and uncovertebral hypertrophy. C2-3: No significant spinal or foraminal stenosis. C3-4: No significant spinal or foraminal stenosis. C4-5: Moderate bilateral foraminal stenosis. C5-6: Moderate to severe left foraminal stenosis. C6-7: Mild bilateral foraminal stenosis. C7-T1: No abnormalities. SOFT TISSUES: There is no prevertebral soft tissue swelling. No acute findings. Multilevel degenerative changes. Atherosclerotic calcifications of the carotid arteries noted. Right apical pleural thickening. The visualized lung apices are otherwise normal.     CT LUMBAR SPINE WO CONTRAST    Result Date: 6/19/2022  EXAMINATION: CT OF THE LUMBAR SPINE WITHOUT CONTRAST  6/19/2022 TECHNIQUE: CT of the lumbar spine was performed without the administration of intravenous contrast. Multiplanar reformatted images are provided for review. Adjustment of mA and/or kV according to patient size was utilized. Automated exposure control, iterative reconstruction, and/or weight based adjustment of the mA/kV was utilized to reduce the radiation dose to as low as reasonably achievable.  COMPARISON: None HISTORY: ORDERING SYSTEM PROVIDED HISTORY: fall lumbar pain TECHNOLOGIST PROVIDED HISTORY: Reason for exam:->fall lumbar pain Decision Support Exception - unselect if not a suspected or confirmed emergency medical condition->Emergency Medical Condition (MA) FINDINGS: BONES/ALIGNMENT: Mild anterolisthesis of L4 on L5, likely degenerative. No evidence of a fracture involving the lumbar spine. Acute compression/burst fracture involving the T12 vertebral body, with greater than 60-70% height loss. Mild osseous retropulsion of fracture fragments is noted. No subluxation. Diffuse bony demineralization is noted. DEGENERATIVE CHANGES: Multilevel degenerative changes, with disc space narrowing and spondylosis as well as facet and ligamentous hypertrophy. Mild bilateral foraminal stenosis is suspected at L4-5 as well as mild spinal stenosis. SOFT TISSUES/RETROPERITONEUM: No paraspinal mass is seen. Acute compression/burst fracture involving the T12 vertebral body, with greater than 60-70% height loss and mild osseous retropulsion of fracture fragments. No subluxation. No fracture or subluxation involving the lumbar spine. Mild anterolisthesis of L4 on L5, likely degenerative. XR CHEST PORTABLE    Result Date: 6/19/2022  EXAMINATION: ONE XRAY VIEW OF THE CHEST 6/19/2022 4:43 am COMPARISON: 24 for very 2019 HISTORY: ORDERING SYSTEM PROVIDED HISTORY: fall TECHNOLOGIST PROVIDED HISTORY: Reason for exam:->fall FINDINGS: Single AP upright portable chest demonstrate satisfactory expansion lungs which are clear. There are no focal alveolar infiltrates or effusions. The cardiac silhouette appears unremarkable. There is no evidence of a pneumothorax. No acute disease. XR HIP 2-3 VW W PELVIS LEFT    Result Date: 6/19/2022  EXAMINATION: ONE XRAY VIEW OF THE PELVIS AND TWO XRAY VIEWS LEFT HIP 6/19/2022 4:43 am COMPARISON: None. HISTORY: ORDERING SYSTEM PROVIDED HISTORY: fall hip pain TECHNOLOGIST PROVIDED HISTORY: Reason for exam:->fall hip pain FINDINGS: A left hip arthroplasty is seen, with anatomic alignment of the hip joint.  There is no evidence of a displaced fracture or dislocation. The right hip is also normally aligned. Degenerative changes of the sacroiliac joints are noted. No acute osseous abnormality is seen. No pathologic calcifications or radiopaque foreign body noted. No soft tissue abnormalities are identified. No acute findings. Discharge Exam:     /66   Pulse 72   Temp 97.4 °F (36.3 °C) (Temporal)   Resp 16   Ht 5' 5\" (1.651 m)   Wt 108 lb (49 kg)   SpO2 99%   BMI 17.97 kg/m²   Physical Exam  Constitutional:       Appearance: Normal appearance. HENT:      Head: Normocephalic and atraumatic. Nose: Nose normal.      Mouth/Throat:      Mouth: Mucous membranes are moist.      Pharynx: Oropharynx is clear. Eyes:      Extraocular Movements: Extraocular movements intact. Pupils: Pupils are equal, round, and reactive to light. Cardiovascular:      Rate and Rhythm: Normal rate and regular rhythm. Pulses: Normal pulses. Heart sounds: Normal heart sounds. Pulmonary:      Effort: Pulmonary effort is normal.      Breath sounds: Normal breath sounds. Abdominal:      General: There is no distension. Palpations: Abdomen is soft. Tenderness: There is no abdominal tenderness. Musculoskeletal:         General: Signs of injury (back pain) present. No tenderness. Cervical back: Normal range of motion and neck supple. Skin:     General: Skin is warm and dry. Neurological:      General: No focal deficit present. Mental Status: She is alert and oriented to person, place, and time. Psychiatric:         Mood and Affect: Mood normal.         Behavior: Behavior normal.         Thought Content: Thought content normal.         Judgment: Judgment normal.     Disposition: SNF    In process/preliminary results:  Outstanding Order Results     No orders found from 5/21/2022 to 6/20/2022.           Patient Instructions:   Current Discharge Medication List           Details   oxyCODONE (ROXICODONE) 5 MG immediate release tablet Take 0.5 tablets by mouth every 6 hours as needed for Pain for up to 3 days. Refills: 0    Comments: Reduce doses taken as pain becomes manageable  Associated Diagnoses: Closed fracture of twelfth thoracic vertebra, unspecified fracture morphology, initial encounter ([facility])      divalproex (DEPAKOTE SPRINKLE) 125 MG capsule Take 1 capsule by mouth every 12 hours  Qty: 60 capsule, Refills: 3      ondansetron (ZOFRAN-ODT) 4 MG disintegrating tablet Take 1 tablet by mouth every 8 hours as needed for Nausea or Vomiting      ondansetron (ZOFRAN) 4 MG/2ML injection Infuse 2 mLs intravenously every 6 hours as needed for Nausea or Vomiting  Qty: 84 mL      hydrALAZINE (APRESOLINE) 20 MG/ML injection Infuse 0.5 mLs intravenously every 30 minutes as needed (SBP >160 and HR < 75)      bisacodyl (DULCOLAX) 5 MG EC tablet Take 1 tablet by mouth daily      polyethylene glycol (GLYCOLAX) 17 g packet Take 17 g by mouth daily  Qty: 527 g, Refills: 1      melatonin 5 MG TBDP disintegrating tablet Take 1 tablet by mouth nightly      methocarbamol (ROBAXIN) 500 MG tablet Take 2 tablets by mouth 4 times daily for 10 days  Qty: 80 tablet, Refills: 0              Details   pantoprazole (PROTONIX) 20 MG tablet Take 1 tablet by mouth every morning (before breakfast)  Qty: 30 tablet, Refills: 3              Details   labetalol (NORMODYNE) 100 MG tablet Take 100 mg by mouth 2 times daily      docusate sodium (COLACE, DULCOLAX) 100 MG CAPS Take 100 mg by mouth daily      apixaban (ELIQUIS) 2.5 MG TABS tablet Take 1 tablet by mouth 2 times daily  Qty: 60 tablet      metoprolol tartrate (LOPRESSOR) 25 MG tablet Take 1 tablet by mouth 2 times daily  Qty: 60 tablet, Refills: 3      Mirabegron ER (MYRBETRIQ) 25 MG TB24 Take by mouth every 48 hours as needed              TRAUMA SERVICES DISCHARGE INSTRUCTIONS    Call [phone], option 2, for any questions/concerns and for follow-up appointment in 1 week(s).     Please follow the instructions checked below:    During the course of your workup, we identified an incidental finding of:    Please follow-up with your primary care provider. ACTIVITY INSTRUCTIONS  Increase activity as tolerated  No heavy lifting or strenuous activity  Take your incentive spirometer home and use 4-6 times/day   []  No driving until cleared by     WOUND/DRESSING INSTRUCTIONS:  You may shower. No sitting in bath tub, hot tub or swimming until cleared by physician. Ice to areas of pain for first 24 hours. Heat to areas of pain after that. Wash areas of lacerations/abrasions with soap & water. Rinse well. Pat dry with clean towel. Apply thin layer of Bacitracin, Neosporin, or triple antibiotic cream to affected area 2-3 times per day. Keep wounds clean and dry. []  Sutures/Staples are to be removed in  week(s). MEDICATION INSTRUCTIONS  Take medication as prescribed. When taking pain medications, you may experience dizziness or drowsiness. Do not drink alcohol or drive when taking these medications. You may experience constipation while taking pain medication. You may take over the counter stool softeners such as docusate (Colace), sennosides S (Senokot-S), or Miralax. [x]  You may take Ibuprofen (over the counter) as directed for mild pain. --You may take up to 800mg every 8 hours for pain, please take with food or milk. [x]  You may take acetaminophen (Tylenol) products. Do NOT take more than 4000mg of Tylenol in 24h. [x]  Do not take any other acetaminophen (Tylenol) products if you are taking Percocet or Norco, as these contain Tylenol. --Do NOT take more than 4000mg of Tylenol in 24h. OPIOID MEDICATION INSTRUCTIONS  Read the medication guide that is included with your prescription. Take your medication exactly as prescribed. Store medication away from children and in a safe place. Do NOT share your medication with others. Do NOT take medication unless it is prescribed for you.   Do NOT drink alcohol while taking opioids (I.e., Norco, Percocet, Oxycodone, etc). Discuss with the Trauma Clinic staff if the dose of medication you are taking does not control your pain and any side effects that you may be having. CALL 911 OR YOUR LOCAL EMERGENCY SERVICE:  --If you take too much medication  --If you have trouble breathing or shortness of breath  --A child has taken this medication. WORK:  You may not return to work until you receive follow-up with the Trauma Clinic or clearance by all consultants. Call the trauma clinic for any of the following or for questions/concerns;  --fever over 101F  --redness, swelling, hardness or warmth at the wound site(s). --Unrelieved nausea/vomiting  --Foul smelling or cloudy drainage at the wound site(s)  --Unrelieved pain or increase in pain  --Increase in shortness of breath    SPECIAL CONSIDERATIONS FOR OUR PATIENTS OVER THE AGE OF 65Y    Getting around your home safely can be a challenge if you have injuries or health problems that make it easy for you to fall. Loose rugs and furniture in walkways are among the dangers for many older people who have problems walking or who have poor eyesight. People who have conditions such as arthritis, osteoporosis, or dementia also must be careful not to fall. You can make your home safer with a few simple measures. Follow-up care is a key part of your treatment and safety. Be sure to make and go to all appointments, and call your doctor or nurse call line if you are having problems. It's also a good idea to know your test results and keep a list of the medicines you take. How can you care for yourself at home? Taking care of yourself   You may get dizzy if you do not drink enough water. To prevent dehydration, drink plenty of fluids, enough so that your urine is light yellow or clear like water. Choose water and other caffeine-free clear liquids.  If you have kidney, heart, or liver disease and have to limit fluids, talk with your doctor before you increase the amount of fluids you drink.  Exercise regularly to improve your strength, muscle tone, and balance. Walk if you can. Swimming may be a good choice if you cannot walk easily.  Have your vision and hearing checked each year or any time you notice a change. If you have trouble seeing and hearing, you might not be able to avoid objects and could lose your balance.  Know the side effects of the medicines you take. Ask your doctor or pharmacist whether the medicines you take can affect your balance. Sleeping pills or sedatives can affect your balance.  Limit the amount of alcohol you drink. Alcohol can impair your balance and other senses.  Ask your doctor whether calluses or corns on your feet need to be removed. If you wear loose-fitting shoes because of calluses or corns, you can lose your balance and fall.  Talk to your doctor if you have numbness in your feet. Preventing falls at home   Remove raised doorway thresholds, throw rugs, and clutter. Repair loose carpet or raised areas in the floor. [facility] furniture and electrical cords to keep them out of walking paths.  Use non-skid floor wax, and wipe up spills right away, especially on ceramic tile floors.  If you use a walker or cane, put rubber tips on it. If you use crutches, clean the bottoms of them regularly with an abrasive pad, such as steel wool.  Keep your house well lit, especially [place], and outside walkways. Use night-lights in areas such as hallways and washrooms. Add extra light switches or use remote switches (such as switches that go on or off when you clap your hands) to make it easier to turn lights on if you have to get up during the night.  Install sturdy handrails on stairways.  Move items in your cabinets so that the things you use a lot are on the lower shelves (about waist level).    Keep a cordless phone and a flashlight with new batteries by your bed. If possible, put a phone in each of the main rooms of your house, or carry a cell phone in case you fall and cannot reach a phone. Or, you can wear a device around your neck or wrist. You push a button that sends a signal for help.  Wear low-heeled shoes that fit well and give your feet good support. Use footwear with non-skid soles. Check the heels and soles of your shoes for wear. Repair or replace worn heels or soles.  Do not wear socks without shoes on wood floors.  Walk on the grass when the sidewalks are slippery. If you live in an area that gets snow and ice in the winter, sprinkle salt on slippery steps and sidewalks. Preventing falls in the bath   Install grab bars and non-skid mats inside and outside your shower or tub and near the toilet and sinks.  Use shower chairs and bath benches.  Use a hand-held shower head that will allow you to sit while showering.  Get into a tub or shower by putting the weaker leg in first. Get out of a tub or shower with your strong side first.   Repair loose toilet seats and consider installing a raised toilet seat to make getting on and off the toilet easier.  Keep your washroom door unlocked while you are in the shower. Follow-up:  Trauma Clinic: [phone] option 2  [address] [address], [address] [place]       Follow up:   [address]  [address] [facility]  [phone]  Schedule an appointment as soon as possible for a visit in 1 week  after discharge from facility, for follow up    Yarelis Garcia MD  [address] [place] [address].   [place] [address]  [phone]    Schedule an appointment as soon as possible for a visit in 1 month  T12 burst fx       Signed:  Wing Tripathi MD  6/22/2022  5:05 PM

## 2022-06-20 NOTE — CARE COORDINATION
6/20/22 Transition of Care: patient is confused. She is here due to a fall at home. Spoke with her  who states they live in a one story home. Patient has been using a walker at home. She is normally not confused. He states she has been to [facility] rehab in the past and he wishes to have a  Referral called to them in case she requires rehab. PT/OT are on hold until MRI are done. Patient follows with Dr Torrey Connolly and pharmacy is [facility] in [place]. Will await MRI report and therapy evaluations.  Electronically signed by Angie Pablo RN CM on 6/20/2022 at 3:30 PM

## 2022-06-20 NOTE — PLAN OF CARE
Problem: Safety - Adult  Goal: Free from fall injury  6/20/2022 1027 by Demetria Webster RN  Outcome: Progressing     Problem: Pain  Goal: Verbalizes/displays adequate comfort level or baseline comfort level  6/20/2022 1027 by Demetria Webster RN  Outcome: Progressing

## 2022-06-20 NOTE — PROGRESS NOTES
Physical Therapy    Date: 2022       Patient Name: Ira Batista  : 1934      MRN: [id number]    PT order received. Chart has been reviewed. PT evaluation will be on hold due to awaiting mri then NS POC which may include TLSO. Will continue to follow and complete evaluation at later time.      Margit Claude, PT

## 2022-06-20 NOTE — PROGRESS NOTES
Trauma Tertiary Survey    Admit Date: 6/19/2022  Hospital day 1    CC:  Mechanical fall from [facility] T12 fx. Alcohol pre-screening:  Women: How many times in the past year have you had 4 or more drinks in a day? none  How much do you drink on a daily basis? Denies etOH use     Scheduled Meds:   pantoprazole  20 mg Oral QAM AC    metoprolol tartrate  25 mg Oral BID    sodium chloride flush  10 mL IntraVENous 2 times per day    methocarbamol  1,000 mg Oral 4x Daily    polyethylene glycol  17 g Oral Daily    acetaminophen  650 mg Oral Q6H    bisacodyl  5 mg Oral Daily     Continuous Infusions:   sodium chloride       PRN Meds:hydrALAZINE, sodium chloride flush, sodium chloride, ondansetron **OR** ondansetron, oxyCODONE **OR** [DISCONTINUED] oxyCODONE    Subjective:     GCS 14, baseline. AO to person and time. Minimal pain this morning in low midline back. No weakness, numbness, paresthesias. Objective:     Patient Vitals for the past 8 hrs:   BP Temp Temp src Pulse Resp SpO2   06/20/22 0754 (!) 174/97 97.5 °F (36.4 °C) Temporal 87 18 98 %   06/20/22 0400 (!) 176/86 97.3 °F (36.3 °C) Temporal 73 20 100 %       No intake/output data recorded. No intake/output data recorded. Past Medical History:   Diagnosis Date    Hypertension     Osteoporosis     TIA (transient ischemic attack)        @[email or web address]@    Radiology:  MRI LUMBAR SPINE WO CONTRAST    (Results Pending)       PHYSICAL EXAM:     Central Nervous System  Loss of consciousness:  No    GCS:    Eye:  4 - Opens eyes on own  Motor:  6 - Follows simple motor commands  Verbal:  4 - Seems confused, disoriented    Neuromuscular blockade: No  Pupil size:  Left 3 mm    Right 3 mm  Pupil reaction: Yes    Wiggles fingers: Left Yes Right Yes  Wiggles toes: Left Yes   Right Yes    Hand grasp:   Left  Present      Right  Present  Plantar flexion: Left  Present      Right   Present    PHYSICAL EXAM  General: No apparent distress, comfortable. Baseline mentation. HEENT: Trachea midline, no masses, Pupils equal round reactive   Chest: Respiratory effort was normal with no retractions or use of accessory muscles. On RA. HAROON [facility] 1250mL   Cardiovascular: Extremities warm, well perfused. Hypertensive. Abdomen:  Soft and non distended. No tenderness, guarding, rebound, or rigidity   Extremities: Moves all 4 extremeties, No pedal edema. Strength 5/5 in all extremities no sensory loss. Spine:   Spine Tenderness ROM   Cervical 0/10 Normal   Thoracic 0 /10 Not indicated    Lumbar 5/10 Not indicated     Musculoskeletal:    Joint Tenderness Swelling ROM   Right shoulder Absent absent normal   Left shoulder absent absent normal   Right elbow absent absent normal   Left elbow absent absent normal   Right wrist absent absent normal   Left wrist absent absent normal   Right hand grasp Absent absent normal   Left hand grasp absent absent normal   Right hip absent absent normal   Left hip absent absent normal   Right knee Absent absent normal   Left knee absent absent normal   Right ankle absent absent normal   Left ankle absent absent normal   Right foot Absent absent normal   Left foot absent absent normal       CONSULTS: Neurosurgery    PROCEDURES: none    INJURIES:      Principal Problem:    Trauma  Active Problems:    T12 compression fracture (HCC)    Closed fracture of twelfth thoracic vertebra (HCC)  Resolved Problems:    * No resolved hospital problems. *        Assessment/Plan:       · Neuro:  GCS 14. T12 burst fx. Spinal neutrality, neurosurgery recommendations TLSO, will consider kypho if she can not tolerate brace or if pain is uncontrolled. Pain control with PRNs. MRI lumbar spine per NSG to guide treatment  · CV: HR near normal limits,hypertension- PRN hydralazine, home BP meds. · Pulm: tolerating room air, ICS, pulm. Hygiene. · GI: tolerating general diet, bowel regimen  · Renal: no acute issues.  Monitor electrolytes, replace as needed  · ID: afebrile, no acute issues · Endocrine: no acute issues,   · MSK: PT/OT when able. · Heme: no acute issues. Monitor H/H holding anticoagulations, home eliquis for hx afib      DVT prophylaxis: SCDs     GI: diet- regular   Glucose protocol: none  Mouth/Eye care: per patient.    Stone: none     Code status:    Full Code    Patient/Family update:  As available     Disposition:  Continue current care       Electronically signed by Jeff Adams MD on 6/20/22 at 8:39 AM EDT

## 2022-06-20 NOTE — PROGRESS NOTES
Dr. Fisher Bring due to son's concern about patient's increased confusion and seeing people, animals, and objects in room that are not here. No attending listed on chart to message at this time.

## 2022-06-21 LAB
ANION GAP SERPL CALCULATED.3IONS-SCNC: 18 MMOL/L (ref 7–16)
BASOPHILS ABSOLUTE: 0.01 E9/L (ref 0–0.2)
BASOPHILS RELATIVE PERCENT: 0.1 % (ref 0–2)
BUN BLDV-MCNC: 25 MG/DL (ref 6–23)
BURR CELLS: ABNORMAL
CALCIUM SERPL-MCNC: 9.8 MG/DL (ref 8.6–10.2)
CHLORIDE BLD-SCNC: 95 MMOL/L (ref 98–107)
CO2: 23 MMOL/L (ref 22–29)
CREAT SERPL-MCNC: 0.6 MG/DL (ref 0.5–1)
EOSINOPHILS ABSOLUTE: 0 E9/L (ref 0.05–0.5)
EOSINOPHILS RELATIVE PERCENT: 0 % (ref 0–6)
GFR AFRICAN AMERICAN: >60
GFR NON-AFRICAN AMERICAN: >60 ML/MIN/1.73
GLUCOSE BLD-MCNC: 95 MG/DL (ref 74–99)
HCT VFR BLD CALC: 44.1 % (ref 34–48)
HEMOGLOBIN: 14.8 G/DL (ref 11.5–15.5)
IMMATURE GRANULOCYTES #: 0.08 E9/L
IMMATURE GRANULOCYTES %: 0.7 % (ref 0–5)
LYMPHOCYTES ABSOLUTE: 0.58 E9/L (ref 1.5–4)
LYMPHOCYTES RELATIVE PERCENT: 5 % (ref 20–42)
MCH RBC QN AUTO: 29.1 PG (ref 26–35)
MCHC RBC AUTO-ENTMCNC: 33.6 % (ref 32–34.5)
MCV RBC AUTO: 86.6 FL (ref 80–99.9)
MONOCYTES ABSOLUTE: 1.17 E9/L (ref 0.1–0.95)
MONOCYTES RELATIVE PERCENT: 10.1 % (ref 2–12)
NEUTROPHILS ABSOLUTE: 9.7 E9/L (ref 1.8–7.3)
NEUTROPHILS RELATIVE PERCENT: 84.1 % (ref 43–80)
PDW BLD-RTO: 12.2 FL (ref 11.5–15)
PLATELET # BLD: 248 E9/L (ref 130–450)
PMV BLD AUTO: 9.9 FL (ref 7–12)
POIKILOCYTES: ABNORMAL
POLYCHROMASIA: ABNORMAL
POTASSIUM REFLEX MAGNESIUM: 3.8 MMOL/L (ref 3.5–5)
RBC # BLD: 5.09 E12/L (ref 3.5–5.5)
SODIUM BLD-SCNC: 136 MMOL/L (ref 132–146)
WBC # BLD: 11.5 E9/L (ref 4.5–11.5)

## 2022-06-21 PROCEDURE — 2580000003 HC RX 258: Performed by: STUDENT IN AN ORGANIZED HEALTH CARE EDUCATION/TRAINING PROGRAM

## 2022-06-21 PROCEDURE — 6370000000 HC RX 637 (ALT 250 FOR IP): Performed by: STUDENT IN AN ORGANIZED HEALTH CARE EDUCATION/TRAINING PROGRAM

## 2022-06-21 PROCEDURE — 99232 SBSQ HOSP IP/OBS MODERATE 35: CPT | Performed by: NEUROLOGICAL SURGERY

## 2022-06-21 PROCEDURE — 80048 BASIC METABOLIC PNL TOTAL CA: CPT

## 2022-06-21 PROCEDURE — 36415 COLL VENOUS BLD VENIPUNCTURE: CPT

## 2022-06-21 PROCEDURE — 6360000002 HC RX W HCPCS

## 2022-06-21 PROCEDURE — 97165 OT EVAL LOW COMPLEX 30 MIN: CPT

## 2022-06-21 PROCEDURE — 6370000000 HC RX 637 (ALT 250 FOR IP): Performed by: SURGERY

## 2022-06-21 PROCEDURE — 97161 PT EVAL LOW COMPLEX 20 MIN: CPT

## 2022-06-21 PROCEDURE — 97530 THERAPEUTIC ACTIVITIES: CPT

## 2022-06-21 PROCEDURE — 97535 SELF CARE MNGMENT TRAINING: CPT

## 2022-06-21 PROCEDURE — 99232 SBSQ HOSP IP/OBS MODERATE 35: CPT | Performed by: SURGERY

## 2022-06-21 PROCEDURE — 6370000000 HC RX 637 (ALT 250 FOR IP)

## 2022-06-21 PROCEDURE — 1200000000 HC SEMI PRIVATE

## 2022-06-21 PROCEDURE — 85025 COMPLETE CBC W/AUTO DIFF WBC: CPT

## 2022-06-21 RX ORDER — MECOBALAMIN 5000 MCG
5 TABLET,DISINTEGRATING ORAL NIGHTLY
Status: DISCONTINUED | OUTPATIENT
Start: 2022-06-21 | End: 2022-06-23 | Stop reason: HOSPADM

## 2022-06-21 RX ORDER — HEPARIN SODIUM 10000 [USP'U]/ML
5000 INJECTION, SOLUTION INTRAVENOUS; SUBCUTANEOUS EVERY 8 HOURS SCHEDULED
Status: DISCONTINUED | OUTPATIENT
Start: 2022-06-21 | End: 2022-06-22

## 2022-06-21 RX ADMIN — PANTOPRAZOLE SODIUM 20 MG: 20 TABLET, DELAYED RELEASE ORAL at 05:35

## 2022-06-21 RX ADMIN — Medication 5 MG: at 21:19

## 2022-06-21 RX ADMIN — DIVALPROEX SODIUM 125 MG: 125 CAPSULE, COATED PELLETS ORAL at 21:19

## 2022-06-21 RX ADMIN — HEPARIN SODIUM 5000 UNITS: 10000 INJECTION INTRAVENOUS; SUBCUTANEOUS at 10:12

## 2022-06-21 RX ADMIN — HEPARIN SODIUM 5000 UNITS: 10000 INJECTION INTRAVENOUS; SUBCUTANEOUS at 23:02

## 2022-06-21 RX ADMIN — SODIUM CHLORIDE, PRESERVATIVE FREE 10 ML: 5 INJECTION INTRAVENOUS at 21:24

## 2022-06-21 RX ADMIN — HEPARIN SODIUM 5000 UNITS: 10000 INJECTION INTRAVENOUS; SUBCUTANEOUS at 16:00

## 2022-06-21 RX ADMIN — DIVALPROEX SODIUM 125 MG: 125 CAPSULE, COATED PELLETS ORAL at 10:12

## 2022-06-21 RX ADMIN — ACETAMINOPHEN 650 MG: 325 TABLET, FILM COATED ORAL at 04:39

## 2022-06-21 RX ADMIN — POLYETHYLENE GLYCOL 3350 17 G: 17 POWDER, FOR SOLUTION ORAL at 10:12

## 2022-06-21 RX ADMIN — METOPROLOL TARTRATE 25 MG: 25 TABLET, FILM COATED ORAL at 10:12

## 2022-06-21 RX ADMIN — BISACODYL 5 MG: 5 TABLET, COATED ORAL at 10:12

## 2022-06-21 RX ADMIN — METOPROLOL TARTRATE 25 MG: 25 TABLET, FILM COATED ORAL at 21:19

## 2022-06-21 ASSESSMENT — ENCOUNTER SYMPTOMS
COUGH: 0
VOMITING: 0
RESPIRATORY NEGATIVE: 1
BLOOD IN STOOL: 0
EYES NEGATIVE: 1
SHORTNESS OF BREATH: 0
BACK PAIN: 1
GASTROINTESTINAL NEGATIVE: 1
DIARRHEA: 0
CONSTIPATION: 0
ANAL BLEEDING: 0
ABDOMINAL PAIN: 0
ALLERGIC/IMMUNOLOGIC NEGATIVE: 1
ABDOMINAL DISTENTION: 0
NAUSEA: 0

## 2022-06-21 ASSESSMENT — PAIN SCALES - GENERAL
PAINLEVEL_OUTOF10: 0
PAINLEVEL_OUTOF10: 0
PAINLEVEL_OUTOF10: 3

## 2022-06-21 NOTE — CARE COORDINATION
6/21/22 Update CM note: Patient remains on general medical floor. She is awake, alert, and cooperative. She is now with worsening confusion and seeing things in the room (dogs/kids). Robaxin has been held. She has a acute T12 fracture on MRI. She is pending a TLSO brace. Once on therapy can be completed. She is being followed by [facility] for [facility] and Anne Sprague is pending therapy evaluations. Will follow for plan of care once therapies are completed.  Electronically signed by Samir Jordan RN CM on 6/21/2022 at 10:43 AM

## 2022-06-21 NOTE — PROGRESS NOTES
Patient found up at end of bed again with bed alarm sounding. Patient had been incontinent of urine. Patient bathed, sheets changed, new gown placed on patient and patient helped back into bed. Patient continues to see a \"boy and girl\" in the corner of her room. Attempted reorientation without success. Bed alarm on, call light within reach.

## 2022-06-21 NOTE — PROGRESS NOTES
Department of Neurosurgery  Progress Note    CHIEF COMPLAINT: pt seen for T12 fracture    SUBJECTIVE:  C/o back pain    REVIEW OF SYSTEMS :  Constitutional: Negative for chills and fever. Neurological: Negative for dizziness, tremors and speech change.    CVS: negative for chest pain  Resp: negative for SOB    OBJECTIVE:   VITALS:  /83   Pulse 80   Temp 97.6 °F (36.4 °C) (Temporal)   Resp 18   Ht 5' 5\" (1.651 m)   Wt 108 lb (49 kg)   SpO2 99%   BMI 17.97 kg/m²   PHYSICAL:  CONSTITUTIONAL:  awake, alert, cooperative, no apparent distress, and appears stated age  CN II-XII intact  ESPINAL 5/5  Skin is warm  Abdomen is soft    DATA:  CBC:   Lab Results   Component Value Date    WBC 11.5 06/21/2022    RBC 5.09 06/21/2022    HGB 14.8 06/21/2022    HCT 44.1 06/21/2022    MCV 86.6 06/21/2022    MCH 29.1 06/21/2022    MCHC 33.6 06/21/2022    RDW 12.2 06/21/2022     06/21/2022    MPV 9.9 06/21/2022     BMP:    Lab Results   Component Value Date     06/21/2022    K 3.8 06/21/2022    CL 95 06/21/2022    CO2 23 06/21/2022    BUN 25 06/21/2022    LABALBU 4.1 06/19/2022    CREATININE 0.6 06/21/2022    CALCIUM 9.8 06/21/2022    GFRAA >60 06/21/2022    LABGLOM >60 06/21/2022    GLUCOSE 95 06/21/2022     PT/INR:    Lab Results   Component Value Date    PROTIME 29.5 06/19/2022    INR 2.7 06/19/2022     PTT:    Lab Results   Component Value Date    APTT 27.3 06/19/2022   [APTT}    Current Inpatient Medications  Current Facility-Administered Medications: melatonin disintegrating tablet 5 mg, 5 mg, Oral, Nightly  heparin (porcine) injection 5,000 Units, 5,000 Units, SubCUTAneous, 3 times per day  pantoprazole (PROTONIX) tablet 20 mg, 20 mg, Oral, QAM AC  hydrALAZINE (APRESOLINE) injection 10 mg, 10 mg, IntraVENous, Q30 Min PRN  divalproex (DEPAKOTE SPRINKLE) capsule 125 mg, 125 mg, Oral, 2 times per day  metoprolol tartrate (LOPRESSOR) tablet 25 mg, 25 mg, Oral, BID  sodium chloride flush 0.9 % injection 10 mL, 10 mL, IntraVENous, 2 times per day  sodium chloride flush 0.9 % injection 10 mL, 10 mL, IntraVENous, PRN  0.9 % sodium chloride infusion, , IntraVENous, PRN  methocarbamol (ROBAXIN) tablet 1,000 mg, 1,000 mg, Oral, 4x Daily  ondansetron (ZOFRAN-ODT) disintegrating tablet 4 mg, 4 mg, Oral, Q8H PRN **OR** ondansetron (ZOFRAN) injection 4 mg, 4 mg, IntraVENous, Q6H PRN  polyethylene glycol (GLYCOLAX) packet 17 g, 17 g, Oral, Daily  acetaminophen (TYLENOL) tablet 650 mg, 650 mg, Oral, Q6H  oxyCODONE (ROXICODONE) immediate release tablet 2.5 mg, 2.5 mg, Oral, Q4H PRN **OR** [DISCONTINUED] oxyCODONE (ROXICODONE) immediate release tablet 5 mg, 5 mg, Oral, Q4H PRN  bisacodyl (DULCOLAX) EC tablet 5 mg, 5 mg, Oral, Daily    ASSESSMENT:   Acute T12 fracture on MRI    PLAN:  TLSO  WBAT with brace  Follow up in office in 4 weeks with thoracic x-rays  No surgery planned      Electronically signed by BHUMI Vasquez on 6/21/2022 at 8:20 AM    I have interviewed and examined the patient and agree with above. I have spent over 20 minutes on medical decision making and in discussing her condition with her.   She has a compression fracture and we will manager this in a Jennie Barrera MD

## 2022-06-21 NOTE — PROGRESS NOTES
Patient continues to be confused and calling out for family. Attempted re-orientation without success. Patient found, with bed alarm going off, attempting to get out of bed over side rails. Patient toileted for urine, repositioned in bed and educated on importance of bed rest. Bed alarm in place and working properly. Resting quietly in bed with eyes open at this time.

## 2022-06-21 NOTE — PROGRESS NOTES
Date: 2022       Patient Name: Jennifer Dates  : 1934      MRN: [id number]    PT order received. Chart has been reviewed. PT evaluation will be on hold until pt has LSO brace. Will continue to follow and complete evaluation at later time.      Chuck Farah, PT

## 2022-06-21 NOTE — PROGRESS NOTES
[facility]  PROGRESS NOTE  ATTENDING NOTE        TRAUMA  MECHANISM:  Mechanical fall    Chief Complaint   Patient presents with   [facility] Consultation     transfer from [facility]; T12 burst fx; from home-fell in kitchen        Fall  Pertinent negatives include no abdominal pain, headaches, nausea or vomiting. Trauma consult. Injury occurred yesterday. Patient had a mechanical fall from standing. She presented to [facility] where she underwent pan scan imaging as well as CT lumbar, CXR, x-ray hips. She was found to have a T12 burst fracture. She is GCS 14 (confusion). She is on Eliquis for Afib. Patient Active Problem List   Diagnosis    Closed subcapital fracture of left femur ([address]    HTN (hypertension), benign    Severe protein-calorie malnutrition (HCC)    Paroxysmal atrial fibrillation (HCC)    Trauma    T12 compression fracture (HCC)    Closed fracture of twelfth thoracic vertebra (HCC)       SUBJECTIVE/OVERNIGHT EVENTS:  Mentation greatly improved today    Review of Systems   Constitutional: Positive for activity change. Negative for appetite change and unexpected weight change. HENT: Negative. Eyes: Negative. Respiratory: Negative. Negative for cough and shortness of breath. Cardiovascular: Negative. Negative for chest pain and leg swelling. Gastrointestinal: Negative. Negative for abdominal distention, abdominal pain, anal bleeding, blood in stool, constipation, diarrhea, nausea and vomiting. Endocrine: Negative. Genitourinary: Negative. Musculoskeletal: Positive for back pain and myalgias. Negative for arthralgias, gait problem and joint swelling. Skin: Negative. Allergic/Immunologic: Negative. Neurological: Negative. Negative for dizziness, weakness and headaches. Hematological: Negative. Psychiatric/Behavioral: Positive for confusion. Negative for decreased concentration and sleep disturbance.        /79   Pulse 74 Temp (!) 96 °F (35.6 °C) (Temporal)   Resp 18   Ht 5' 5\" (1.651 m)   Wt 108 lb (49 kg)   SpO2 97%   BMI 17.97 kg/m²   Physical Exam  Constitutional:       Appearance: Normal appearance. HENT:      Head: Normocephalic and atraumatic. Nose: Nose normal.      Mouth/Throat:      Mouth: Mucous membranes are moist.      Pharynx: Oropharynx is clear. Eyes:      Extraocular Movements: Extraocular movements intact. Pupils: Pupils are equal, round, and reactive to light. Cardiovascular:      Rate and Rhythm: Normal rate and regular rhythm. Pulses: Normal pulses. Heart sounds: Normal heart sounds. Pulmonary:      Effort: Pulmonary effort is normal.      Breath sounds: Normal breath sounds. Abdominal:      General: There is no distension. Palpations: Abdomen is soft. Tenderness: There is no abdominal tenderness. Musculoskeletal:         General: Signs of injury (back pain) present. No tenderness. Cervical back: Normal range of motion and neck supple. Skin:     General: Skin is warm and dry. Neurological:      General: No focal deficit present. Mental Status: She is alert and oriented to person, place, and time. Psychiatric:         Mood and Affect: Mood normal.         Behavior: Behavior normal.         Thought Content: Thought content normal.         Judgment: Judgment normal.         ASSESSMENT/PLAN:  1. T12 burst fracture--LSO brace, NSGY following  2. Delirium--start depakote  3.   Atrial fibrillation--c/w metoprolol    INCIDENTAL FINDINGS:  none    AMPAC:  TBD/24    DVT/GI ppx:  Heparin sub-q/diet    Kamilah Fairbanks MD, MSc, FACS  6/21/2022  7:22 PM

## 2022-06-21 NOTE — PROGRESS NOTES
of environmental modifications for increased safety with functional transfers/mobility and ADLs  * Cognitive retraining/development of therapeutic activities to improve problem solving, judgement, memory, and attention for increased safety/participation in ADL/IADL tasks  * Therapeutic exercise to improve motor endurance, ROM, and functional strength for ADLs/functional transfers  * Therapeutic activities to facilitate/challenge dynamic balance, stand tolerance for increased safety and independence with ADLs  * Positioning to improve skin integrity, interaction with environment and functional independence    Home Living: Pt lives with  in 1 story home with 4 steps & 2 handrails to enter. Bathroom setup: [address] owned: Shower chair, ww, quad cane, reacher, sockaid    Prior Level of Function: IND with ADLs , IND with IADLs; engaged in functional mobility with use of  ww  Driving: No  Occupation: None reported    Pain Level: Pt with no c/o pain throughout session  Cognition: A&O: 4/4; Follows 1 step directions. Pt required mod cuing for sequencing & directional cues of simple, 1-step commands. Memory:  Fair   Sequencing:  Fair-   Problem solving:  Fair-   Judgement/safety:  Fair-     Functional Assessment:  AM-PAC Daily Activity Raw Score: 14/24   Initial Eval Status  Date: 6/21/22 Treatment Status  Date: STGs = LTGs  Time frame: 10-14 days   Feeding Stand by Assist   Independent    Grooming Minimal Assist   Modified [place]    UB Dressing Maximal Assist   Modified [place]    LB Dressing Maximal Assist   Modified [place]    Bathing Moderate Assist  Modified [place]    Toileting Maximal Assist  Modified [place]    Bed Mobility  Log Roll: Minimal Assist  Supine to sit: Moderate Assist   Sit to supine:NT  Supine to sit:  Independent   Sit to supine: Independent    Functional Transfers Sit to stand:Minimal Assist   Stand to sit:Minimal Assist  Stand pivot: Minimal Assist  Commode: NT  Sit to stand:Modified [place]    Stand to sit:Modified [place]   Stand pivot: Modified [place]   Commode: Modified [place]     Functional Mobility Minimal Assist  Use of ww shorter than household distance  Modified [place] with use of Appropriate AD   Balance Sitting:     Static - SBA     Dynamic - Minimal Assist  Standing: Minimal Assist  Sitting:     Static: Independent     Dynamic: Independent  Standing: Modified [place]    Activity Tolerance Fair  Good   Visual/  Perceptual Glasses: Yes  Appears WFL        Safety Fair-  Good  during ADL completion following spinal precautions     Hand Dominance Right   AROM (PROM) Strength Additional Info:  Goal:   RUE  WFL 4-/5 grossly tested good  and wfl FMC/dexterity noted during ADL tasks   Improve overall RUE strength WFL for participation in functional tasks     LUE WFL 4-/5 grossly tested Good  and wfl FMC/dexterity noted during ADL tasks   Improve overall LUE strength WFL for participation in functional tasks       [facility]   Sensation:  No c/o numbness or tingling BUE  Tone: WFL BUE  Edema: Unremarkable    Comment: Cleared by RN to see pt. Upon arrival patient supine in bed and agreeable to OT session. At end of session, patient seated in bedside chair with call light and phone within reach, all lines and tubes intact. Overall patient demonstrated decreased independence and safety during completion of ADL/functional transfer/mobility tasks. Therapist facilitated ADL tasks, functional transfers, functional mobility, bed mobility to address safety awareness, implementation of fall prevention strategies, & engagement throughout functional tasks. Pt & family educated on spinal precautions & fall prevention strategies to promote safety awareness & functional engagement throughout daily activities.  Pt would benefit from continued skilled OT to increase safety and independence with completion of ADL/IADL tasks for functional independence and quality of life. Treatment: OT treatment provided this date includes:    ADL-  Instruction/training on safety and adapted techniques for completion of ADLs: Pt incontinent of urine during session requiring posterior hygiene care & dynamic standing balance while standing with use of ww. Pt required assist to don pants seated EOB to maintain spinal neutrality. Pt required assist to don/doff TLSO with cuing for proper body mechanics/spinal neutrality. Pt required mod verbal/tactile cues throughout functional tasks for sequencing of commands. Pt & family educated on spinal precautions & activity modifications/adaptations to promote skin/joint integrity & safety awareness throughout ADLs.   Mobility-  Instruction/training on safety and improved independence with bed mobility/functional transfers and functional mobility. Pt required use of ww to maintain dynamic standing balance throughout session. Pt required assist/cuing of ww management/safety throughout functional transfers/mobility.   Sitting EOB ~10 minutes to improve dynamic sitting balance and activity tolerance during ADLs.  Skilled positioning/alignment-  Proper Positioning/Alignment. Pt required assist/cuing to maintain proper body mechanics & spinal neutrality throughout session to promote skin/joint integrity, safety awareness, & implementation of fall prevention strategies throughout functional tasks. Rehab Potential: Good for established goals     LTG: maximize independence with ADLs to return to PLOF    Patient and/or family were instructed on functional diagnosis, prognosis/goals and OT plan of care. Demonstrated fair- understanding.        Eval Complexity: Low  · History: Expanded chart review of medical records and additional review of physical, cognitive, or psychosocial history related to current functional performance  · Exam: 3+ performance deficits  · Assistance/Modification: Min/mod assistance or modifications required to perform tasks. May have comorbidities that affect occupational performance. Evaluation time includes thorough review of current medical information, gathering information on past medical & social history & PLOF, completion of standardized testing, informal observation of tasks, consultation with other medical professions/disciplines, assessment of data & development of POC/goals. Time In: 3:15p  Time Out: 3:40p  Total Treatment Time: 10 minutes    Min Units   OT Eval Low 60612  x     OT Eval Medium 38978      OT Eval High 06694      OT Re-Eval [id number]       Therapeutic Ex [id number]       Therapeutic Activities 72406       ADL/Self Care 19234  10 1    Orthotic Management 45587       Manual [id number]     Neuro Re-Ed [id number]       Non-Billable Time          Evaluation Time additionally includes thorough review of current medical information, gathering information on past medical history/social history and prior level of function, interpretation of standardized testing/informal observation of tasks, assessment of data and development of plan of care and goals.               Vero Nogueira OTR/L; [id number]

## 2022-06-21 NOTE — PROGRESS NOTES
Physician Progress Note      PATIENT:               Mookie Infante  CSN #:                  [id number]  :                       1934  ADMIT DATE:       2022 12:02 PM  [address] DATE:  RESPONDING  PROVIDER #:        Lilian Hua MD          QUERY TEXT:    Patient admitted T 12 fracture following a fall. Noted documentation of  t   12 burst fractue per H&P on   and compression fracture per neurosurgical   consult . If possible, please document in progress notes and discharge summary if you   are evaluating and /or treating any of the following: The medical record reflects the following:  Risk Factors: age Osteoporosis HTN  Clinical Indicators: MRI: Acute compression fracture at T12 with 70-80% loss   in height centrally. Minimal retropulsion. Otherwise degenerate changes lumbar spine notably at   L4-5 with moderate  severe degenerate changes level. Anterolisthesis L4 on L5 identified due to  facet arthropathy  Treatment: MRI trauma and neursurgical consult possible kypoplasty PT consult    Tania Buckner RN BSN CCDS  Options provided:  -- Traumatic T 12 burst fracture  confirmed and T 12 non traumatic compression   fracture ruled out  -- T 12 non traumatic compression fracture confirmed and traumatic T 12 burst   fracture ruled out  -- T 12 non traumatic compression fracture and traumatic T 12 burst fracture    confirmed  -- T 12 compression fracture and traumatic T 12 burst fracture ruled out ruled   out  -- Other - I will add my own diagnosis  -- Disagree - Not applicable / Not valid  -- Disagree - Clinically unable to determine / Unknown  -- Refer to Clinical Documentation Reviewer    PROVIDER RESPONSE TEXT:    After study, T 12 burst fracture confirmed and T 12 non traumatic compression   fracture ruled out.     Query created by: Reese Hutchinson on 2022 6:36 AM      Electronically signed by:  Lilian Hua MD 2022 7:59 AM

## 2022-06-21 NOTE — PROGRESS NOTES
[facility]  PROGRESS NOTE  ATTENDING NOTE        TRAUMA  MECHANISM:  Mechanical fall    Chief Complaint   Patient presents with   [facility] Consultation     transfer from [facility]; T12 burst fx; from home-fell in kitchen        HPI  Trauma consult. Injury occurred yesterday. Patient had a mechanical fall from standing. She presented to [facility] where she underwent pan scan imaging as well as CT lumbar, CXR, x-ray hips. She was found to have a T12 burst fracture. She is GCS 14 (confusion). She is on Eliquis for Afib. Patient Active Problem List   Diagnosis    Closed subcapital fracture of left femur ([address]    HTN (hypertension), benign    Severe protein-calorie malnutrition (HCC)    Paroxysmal atrial fibrillation (HCC)    Trauma    T12 compression fracture (HCC)    Closed fracture of twelfth thoracic vertebra (HCC)       SUBJECTIVE/OVERNIGHT EVENTS:  confused    Review of Systems   Constitutional: Positive for activity change. Negative for appetite change and unexpected weight change. HENT: Negative. Eyes: Negative. Respiratory: Negative. Negative for cough and shortness of breath. Cardiovascular: Negative. Negative for chest pain and leg swelling. Gastrointestinal: Negative. Negative for abdominal distention, abdominal pain, anal bleeding, blood in stool, constipation, diarrhea, nausea and vomiting. Endocrine: Negative. Genitourinary: Negative. Musculoskeletal: Positive for back pain and myalgias. Negative for arthralgias, gait problem and joint swelling. Skin: Negative. Allergic/Immunologic: Negative. Neurological: Negative. Negative for dizziness, weakness and headaches. Hematological: Negative. Psychiatric/Behavioral: Positive for confusion. Negative for decreased concentration and sleep disturbance.        /83   Pulse 80   Temp 97.6 °F (36.4 °C) (Temporal)   Resp 18   Ht 5' 5\" (1.651 m)   Wt 108 lb (49 kg)   SpO2 99%   BMI 17.97 kg/m²   Physical Exam  Constitutional:       Appearance: Normal appearance. HENT:      Head: Normocephalic and atraumatic. Nose: Nose normal.      Mouth/Throat:      Mouth: Mucous membranes are moist.      Pharynx: Oropharynx is clear. Eyes:      Extraocular Movements: Extraocular movements intact. Pupils: Pupils are equal, round, and reactive to light. Cardiovascular:      Rate and Rhythm: Normal rate and regular rhythm. Pulses: Normal pulses. Heart sounds: Normal heart sounds. Pulmonary:      Effort: Pulmonary effort is normal.      Breath sounds: Normal breath sounds. Abdominal:      General: There is no distension. Palpations: Abdomen is soft. Tenderness: There is no abdominal tenderness. Musculoskeletal:         General: Signs of injury (back pain) present. No tenderness. Cervical back: Normal range of motion and neck supple. Skin:     General: Skin is warm and dry. Neurological:      General: No focal deficit present. Mental Status: She is alert and oriented to person, place, and time. Psychiatric:         Mood and Affect: Mood normal.         Behavior: Behavior normal.         Thought Content: Thought content normal.         Judgment: Judgment normal.         ASSESSMENT/PLAN:  1. T12 burst fracture--TLSO brace, NSGY following  2. Delirium--start depakote  3.   Atrial fibrillation--c/w metoprolol    INCIDENTAL FINDINGS:  none    AMPAC:  TBD/24    DVT/GI ppx:  Heparin sub-q/diet    Edie Bennett MD, MSc, FACS  6/20/2022  8:42 PM

## 2022-06-21 NOTE — PROGRESS NOTES
Called [facility] and spoke to Mag Monroe regarding Off the Shelf TLSO brace. Faxed information to office.

## 2022-06-21 NOTE — PROGRESS NOTES
Occupational Therapy    Date:2022  Patient Name: Ryder Real  MRN: [id number]  : 1934  Room: [address] [place], [place] [address]     OT orders received and chart reviewed. OT eval on hold at this time pending arrival of TLSO brace for OOB activity. OT will follow and re-attempt eval as appropriate at a later time/date.     Ritta Fraction OTR/L; [id number]

## 2022-06-21 NOTE — PROGRESS NOTES
Physical Therapy  Physical Therapy Initial Assessment       Name: Milagros Osorio  : 1934  MRN: [id number]      Date of Service: 2022    Evaluating PT:  Brian Long PT, DPT  [id number]    Room #:  4938/6799-B  Diagnosis:  Trauma [T14.90XA]  Closed fracture of twelfth thoracic vertebra, unspecified fracture morphology, [facility] encounter ([facility] [S27.980R]  PMHx/PSHx:  HTN, Osteoporosis, TIA    Procedure/Surgery:    Precautions:  Soft TLSO, Falls, Alarm, Cognition  Equipment Needs:  TBD    SUBJECTIVE:    Pt lives with spouse in a 1 story home with 4 stairs to enter and bilateral rail. Bed is on first floor and bath is on first floor. Pt ambulated with SPC independently PTA. Equipment Owned: SPC and Walker    OBJECTIVE:   Initial Evaluation  Date:  Treatment Short Term/ Long Term   Goals   AM-PAC 6 Clicks      Was pt agreeable to Eval/treatment? Yes     Does pt have pain? Mild pain in R hip     Bed Mobility  Rolling: NT  Supine to sit: NT  Sit to supine: NT  Scooting: SBA  Rolling: Independent  Supine to sit: Independent  Sit to supine: Independent  Scooting: Independent     Transfers Sit to stand: [address] [place]  Stand to sit: ModA  Stand pivot: ModA [address]  Sit to stand: Supervision  Stand to sit: Supervision  Stand pivot: Supervision   Ambulation    15 feet with ModA [address]  >150 feet with supervision AAD   Stair negotiation: ascended and descended  NT  >4 steps with single rail supervision   ROM BUE:  Defer to OT  BLE:  WFL     Strength BUE:  Defer to OT  BLE:  3+/5  Improve 1 MMT   Balance Sitting EOB:  SBA  Dynamic Standing:  ModA [address]  Sitting EOB:  Independent    Dynamic Standing:  supervision     Pt is A & O x (self, place). Disoriented to time and situation. Follows simple commands with proper cues. Cognitive deficits noted and pt requires increased time with all mobility  Sensation:  WNL  Edema:   WNL    Vitals:    HR 52  Spo2 97%   PRE  /60 (seated)  ACTIVITY  HR 52  Spo2 95%   POST      Patient education  Pt educated on role of PT    Patient response to education:   Pt verbalized understanding Pt demonstrated skill Pt requires further education in this area   x x x     ASSESSMENT:    Conditions Requiring Skilled Therapeutic Intervention:    [x]Decreased strength     [x]Decreased ROM  [x]Decreased functional mobility  [x]Decreased balance   [x]Decreased endurance   []Decreased posture  []Decreased sensation  []Decreased coordination   []Decreased vision  [x]Decreased safety awareness   [x]Increased pain       Comments:  Pt agreeable to PT evaluation in bedside chair. Pt presents with cognitive, strength, balance, and endurance deficits. Pt educated on spinal precautions. Pt required steadying assistance in stance. Pt mildly unsteady with mobility requiring assistance throughout. Distance limited by reports of lightheadedness. Seated BP stable. Patient would benefit from continued skilled PT to maximize functional mobility independence. Alarm activated    Treatment:  Patient practiced and was instructed in the following treatment:     Functional transfers-Verbal cues for proper positioning and sequencing to perform transfers safely with maximum independence.  Gait training-Verbal cues for proper positioning and sequencing using assistive device to maximize functional mobility independence. Pt's/ family goals   1. Get better    Prognosis is good for reaching above PT goals. Patient and or family understand(s) diagnosis, prognosis, and plan of care.   yes    PHYSICAL THERAPY PLAN OF CARE:    PT POC is established based on physician order and patient diagnosis     Referring provider/PT Order:    06/19/22 1700  PT evaluation and treat  Start:  06/19/22 1700,   End:  06/19/22 1700,   ONE TIME,   Standing Count:  1 Occurrences,   Monica Wang MD        Diagnosis:  Trauma [T14.90XA]  Closed fracture of twelfth thoracic vertebra, unspecified fracture morphology, initial encounter ([facility] [S90.519N]   Specific instructions for next treatment:  Increase ambulation distance. Continue transfer training    Current Treatment Recommendations:     [x] Strengthening to improve independence with functional mobility   [x] ROM to improve independence with functional mobility   [x] Balance Training to improve static/dynamic balance and to reduce fall risk  [x] Endurance Training to improve activity tolerance during functional mobility   [x] Transfer Training to improve safety and independence with all functional transfers   [x] Gait Training to improve gait mechanics, endurance and assess need for appropriate assistive device  [x] Stair Training in preparation for safe discharge home and/or into the community   [x] Positioning to prevent skin breakdown and contractures  [x] Safety and Education Training   [x] Patient/Caregiver Education   [] HEP  [] Other     PT long term treatment goals are located in above grid    Frequency of treatments: 5-7x/week x 1-2 weeks. Time in  1535  Time out  1554    Total Treatment Time  8 minutes     Evaluation Time includes thorough review of current medical information, gathering information on past medical history/social history and prior level of function, completion of standardized testing/informal observation of tasks, assessment of data and education on plan of care and goals.     CPT codes:  [x] Low Complexity PT evaluation 78221  [] Moderate Complexity PT evaluation 50727  [] High Complexity PT evaluation 42531  [] PT Re-evaluation 84149  [] Gait training 08619 0 minutes  [] Manual therapy 27274 0 minutes  [x] Therapeutic activities 49640 8 minutes  [] Therapeutic exercises 14611 0 minutes  [] Neuromuscular reeducation 55809 0 minutes       Kanika Mccall PT, DPT   [id number]

## 2022-06-22 PROCEDURE — 99232 SBSQ HOSP IP/OBS MODERATE 35: CPT | Performed by: NEUROLOGICAL SURGERY

## 2022-06-22 PROCEDURE — 97116 GAIT TRAINING THERAPY: CPT

## 2022-06-22 PROCEDURE — 1200000000 HC SEMI PRIVATE

## 2022-06-22 PROCEDURE — 97530 THERAPEUTIC ACTIVITIES: CPT

## 2022-06-22 PROCEDURE — 6360000002 HC RX W HCPCS

## 2022-06-22 PROCEDURE — 97535 SELF CARE MNGMENT TRAINING: CPT

## 2022-06-22 PROCEDURE — 6370000000 HC RX 637 (ALT 250 FOR IP): Performed by: STUDENT IN AN ORGANIZED HEALTH CARE EDUCATION/TRAINING PROGRAM

## 2022-06-22 PROCEDURE — 6370000000 HC RX 637 (ALT 250 FOR IP)

## 2022-06-22 PROCEDURE — 99232 SBSQ HOSP IP/OBS MODERATE 35: CPT | Performed by: SURGERY

## 2022-06-22 PROCEDURE — 6370000000 HC RX 637 (ALT 250 FOR IP): Performed by: SURGERY

## 2022-06-22 RX ORDER — ONDANSETRON 2 MG/ML
4 INJECTION INTRAMUSCULAR; INTRAVENOUS EVERY 6 HOURS PRN
Qty: 84 ML | DISCHARGE
Start: 2022-06-22

## 2022-06-22 RX ORDER — ONDANSETRON 4 MG/1
4 TABLET, ORALLY DISINTEGRATING ORAL EVERY 8 HOURS PRN
DISCHARGE
Start: 2022-06-22

## 2022-06-22 RX ORDER — DIVALPROEX SODIUM 125 MG/1
125 CAPSULE, COATED PELLETS ORAL EVERY 12 HOURS SCHEDULED
Qty: 60 CAPSULE | Refills: 3 | DISCHARGE
Start: 2022-06-22

## 2022-06-22 RX ORDER — POLYETHYLENE GLYCOL 3350 17 G/17G
17 POWDER, FOR SOLUTION ORAL DAILY
Qty: 527 G | Refills: 1 | DISCHARGE
Start: 2022-06-23 | End: 2022-07-23

## 2022-06-22 RX ORDER — MECOBALAMIN 5000 MCG
5 TABLET,DISINTEGRATING ORAL NIGHTLY
DISCHARGE
Start: 2022-06-22

## 2022-06-22 RX ORDER — OXYCODONE HYDROCHLORIDE 5 MG/1
2.5 TABLET ORAL EVERY 6 HOURS PRN
Refills: 0 | Status: SHIPPED | DISCHARGE
Start: 2022-06-22 | End: 2022-06-23

## 2022-06-22 RX ORDER — METHOCARBAMOL 500 MG/1
1000 TABLET, FILM COATED ORAL 4 TIMES DAILY
Qty: 80 TABLET | Refills: 0 | DISCHARGE
Start: 2022-06-22 | End: 2022-07-02

## 2022-06-22 RX ORDER — HYDRALAZINE HYDROCHLORIDE 20 MG/ML
10 INJECTION INTRAMUSCULAR; INTRAVENOUS EVERY 30 MIN PRN
Status: ON HOLD | DISCHARGE
Start: 2022-06-22 | End: 2022-10-17 | Stop reason: HOSPADM

## 2022-06-22 RX ORDER — PANTOPRAZOLE SODIUM 20 MG/1
20 TABLET, DELAYED RELEASE ORAL
Qty: 30 TABLET | Refills: 3 | DISCHARGE
Start: 2022-06-23

## 2022-06-22 RX ORDER — POTASSIUM CHLORIDE 20 MEQ/1
40 TABLET, EXTENDED RELEASE ORAL ONCE
Status: COMPLETED | OUTPATIENT
Start: 2022-06-22 | End: 2022-06-22

## 2022-06-22 RX ADMIN — APIXABAN 2.5 MG: 2.5 TABLET, FILM COATED ORAL at 08:11

## 2022-06-22 RX ADMIN — BISACODYL 5 MG: 5 TABLET, COATED ORAL at 08:06

## 2022-06-22 RX ADMIN — POLYETHYLENE GLYCOL 3350 17 G: 17 POWDER, FOR SOLUTION ORAL at 08:06

## 2022-06-22 RX ADMIN — Medication 5 MG: at 21:13

## 2022-06-22 RX ADMIN — HEPARIN SODIUM 5000 UNITS: 10000 INJECTION INTRAVENOUS; SUBCUTANEOUS at 05:41

## 2022-06-22 RX ADMIN — METHOCARBAMOL 1000 MG: 500 TABLET ORAL at 16:34

## 2022-06-22 RX ADMIN — METOPROLOL TARTRATE 25 MG: 25 TABLET, FILM COATED ORAL at 08:06

## 2022-06-22 RX ADMIN — APIXABAN 2.5 MG: 2.5 TABLET, FILM COATED ORAL at 21:14

## 2022-06-22 RX ADMIN — DIVALPROEX SODIUM 125 MG: 125 CAPSULE, COATED PELLETS ORAL at 08:06

## 2022-06-22 RX ADMIN — PANTOPRAZOLE SODIUM 20 MG: 20 TABLET, DELAYED RELEASE ORAL at 05:41

## 2022-06-22 RX ADMIN — METOPROLOL TARTRATE 25 MG: 25 TABLET, FILM COATED ORAL at 21:13

## 2022-06-22 RX ADMIN — DIVALPROEX SODIUM 125 MG: 125 CAPSULE, COATED PELLETS ORAL at 21:13

## 2022-06-22 RX ADMIN — POTASSIUM CHLORIDE 40 MEQ: 20 TABLET, EXTENDED RELEASE ORAL at 08:11

## 2022-06-22 RX ADMIN — ACETAMINOPHEN 650 MG: 325 TABLET, FILM COATED ORAL at 16:34

## 2022-06-22 ASSESSMENT — ENCOUNTER SYMPTOMS
ABDOMINAL DISTENTION: 0
GASTROINTESTINAL NEGATIVE: 1
RESPIRATORY NEGATIVE: 1
BACK PAIN: 1
EYES NEGATIVE: 1
NAUSEA: 0
ANAL BLEEDING: 0
SHORTNESS OF BREATH: 0
VOMITING: 0
CONSTIPATION: 0
DIARRHEA: 0
ABDOMINAL PAIN: 0
ALLERGIC/IMMUNOLOGIC NEGATIVE: 1
COUGH: 0
BLOOD IN STOOL: 0

## 2022-06-22 ASSESSMENT — PAIN SCALES - GENERAL
PAINLEVEL_OUTOF10: 3
PAINLEVEL_OUTOF10: 3

## 2022-06-22 NOTE — PROGRESS NOTES
[facility]  PROGRESS NOTE  ATTENDING NOTE        TRAUMA  MECHANISM:  Mechanical fall    Chief Complaint   Patient presents with   [facility] Consultation     transfer from [facility]; T12 burst fx; from home-fell in kitchen        Fall  Pertinent negatives include no abdominal pain, headaches, nausea or vomiting. Trauma consult. Injury occurred yesterday. Patient had a mechanical fall from standing. She presented to [facility] where she underwent pan scan imaging as well as CT lumbar, CXR, x-ray hips. She was found to have a T12 burst fracture. She is GCS 14 (confusion). She is on Eliquis for Afib. Patient Active Problem List   Diagnosis    Closed subcapital fracture of left femur ([address]    HTN (hypertension), benign    Severe protein-calorie malnutrition (HCC)    Paroxysmal atrial fibrillation (HCC)    Trauma    T12 compression fracture (HCC)    Closed fracture of twelfth thoracic vertebra (HCC)       SUBJECTIVE/OVERNIGHT EVENTS:  Doing well, complains brace not fitting well    Review of Systems   Constitutional: Positive for activity change. Negative for appetite change and unexpected weight change. HENT: Negative. Eyes: Negative. Respiratory: Negative. Negative for cough and shortness of breath. Cardiovascular: Negative. Negative for chest pain and leg swelling. Gastrointestinal: Negative. Negative for abdominal distention, abdominal pain, anal bleeding, blood in stool, constipation, diarrhea, nausea and vomiting. Endocrine: Negative. Genitourinary: Negative. Musculoskeletal: Positive for back pain and myalgias. Negative for arthralgias, gait problem and joint swelling. Skin: Negative. Allergic/Immunologic: Negative. Neurological: Negative. Negative for dizziness, weakness and headaches. Hematological: Negative. Psychiatric/Behavioral: Positive for confusion. Negative for decreased concentration and sleep disturbance.        /66 Pulse 72   Temp 97.4 °F (36.3 °C) (Temporal)   Resp 16   Ht 5' 5\" (1.651 m)   Wt 108 lb (49 kg)   SpO2 99%   BMI 17.97 kg/m²   Physical Exam  Constitutional:       Appearance: Normal appearance. HENT:      Head: Normocephalic and atraumatic. Nose: Nose normal.      Mouth/Throat:      Mouth: Mucous membranes are moist.      Pharynx: Oropharynx is clear. Eyes:      Extraocular Movements: Extraocular movements intact. Pupils: Pupils are equal, round, and reactive to light. Cardiovascular:      Rate and Rhythm: Normal rate and regular rhythm. Pulses: Normal pulses. Heart sounds: Normal heart sounds. Pulmonary:      Effort: Pulmonary effort is normal.      Breath sounds: Normal breath sounds. Abdominal:      General: There is no distension. Palpations: Abdomen is soft. Tenderness: There is no abdominal tenderness. Musculoskeletal:         General: Signs of injury (back pain) present. No tenderness. Cervical back: Normal range of motion and neck supple. Skin:     General: Skin is warm and dry. Neurological:      General: No focal deficit present. Mental Status: She is alert and oriented to person, place, and time. Psychiatric:         Mood and Affect: Mood normal.         Behavior: Behavior normal.         Thought Content: Thought content normal.         Judgment: Judgment normal.         ASSESSMENT/PLAN:  1. T12 burst fracture--LSO brace, NSGY following  2. Delirium--c/w depakote, improved  3.   Atrial fibrillation--c/w metoprolol    INCIDENTAL FINDINGS:  none    AMPAC:  12/24    DVT/GI ppx:  eliquis/diet    Ok to discharge to SNF    Tristan Bonilla MD, MSc, FACS  6/22/2022  3:47 PM

## 2022-06-22 NOTE — PROGRESS NOTES
Physical Therapy  Physical Therapy Initial Assessment       Name: Be Arellano  : 1934  MRN: [id number]      Date of Service: 2022    Evaluating PT:  Uli Reed PT, DPT  [id number]    Room #:  2502/7138-F  Diagnosis:  Trauma [T14.90XA]  Closed fracture of twelfth thoracic vertebra, unspecified fracture morphology, [facility] [S20.133W]  PMHx/PSHx:  HTN, Osteoporosis, TIA    Procedure/Surgery:    Precautions:  Soft TLSO, Falls, Alarm, Cognition  Equipment Needs:  TBD    SUBJECTIVE:    Pt lives with spouse in a 1 story home with 4 stairs to enter and bilateral rail. Bed is on first floor and bath is on first floor. Pt ambulated with SPC independently PTA. Equipment Owned: SPC and Walker    OBJECTIVE:   Initial Evaluation  Date:  Treatment   Short Term/ Long Term   Goals   AM-PAC 6 Clicks 39/15 68/52    Was pt agreeable to Eval/treatment? Yes yes    Does pt have pain? Mild pain in R hip No     Bed Mobility  Rolling: NT  Supine to sit: NT  Sit to supine: NT  Scooting: SBA Rolling: min a  Supine to sit: mod a  Sit to supine: mod a  Scooting: SBA Rolling: Independent  Supine to sit:  Independent  Sit to supine: Independent  Scooting: Independent     Transfers Sit to stand: [address] [place]  Stand to sit: ModA  Stand pivot: ModA [employer] Sit to stand: Mic  Stand to sit: Mic  Stand pivot: Mic [employer]    Sit to stand: Supervision  Stand to sit: Supervision  Stand pivot: Supervision   Ambulation    15 feet with ModA [employer] 10 ft with min a FWW >150 feet with supervision AAD   Stair negotiation: ascended and descended  NT NT >4 steps with single rail supervision   ROM BUE:  Defer to OT  BLE:  WFL     Strength BUE:  Defer to OT  BLE:  3+/5  Improve 1 MMT   Balance Sitting EOB:  SBA  Dynamic Standing:  ModA [employer]  Sitting EOB:  Independent    Dynamic Standing:  supervision     Pt is A & O x 4, following all one-step commands today  Sensation:  WNL  Edema:  WNL    Patient education  Pt educated on role of PT    Patient response to education:   Pt verbalized understanding Pt demonstrated skill Pt requires further education in this area   x x x     ASSESSMENT:    Conditions Requiring Skilled Therapeutic Intervention:    [x]Decreased strength     [x]Decreased ROM  [x]Decreased functional mobility  [x]Decreased balance   [x]Decreased endurance   []Decreased posture  []Decreased sensation  []Decreased coordination   []Decreased vision  [x]Decreased safety awareness   [x]Increased pain       Comments:  Pt agreeable to PT treatment. Pt laying on brace in spine but brace not donned. Chest plate removed per orthotist d/t irritation. Pt brace donned, brace too large for pt but donned as appropriately as able. Pt with some recall of spinal precautions but was re-educated and showed good carryover today. Pt noted no dizziness upoon sitting EOB but did note \"shakiness\" in LEs upon standing at EOB. Pt able to transfer to chair, seated rest required after transfer. Pt instructed to ambulate in room to tolerance, able to ambulate 10 ft with FWW before returning to chair. Activity tolerance limited by fatigue, weakness. Pt left in chair at end of session with all needs met and call light in reach. Alarm activated    Treatment:  Patient practiced and was instructed in the following treatment:     Functional transfers-Verbal cues for proper positioning and sequencing to perform transfers safely with maximum independence.  Gait training-Verbal cues for proper positioning and sequencing using assistive device to maximize functional mobility independence. Pt's/ family goals   1. Get better    Prognosis is good for reaching above PT goals. Patient and or family understand(s) diagnosis, prognosis, and plan of care.   yes    PHYSICAL THERAPY PLAN OF CARE:    PT POC is established based on physician order and patient diagnosis     Referring provider/PT Order:    06/19/22 1700  PT evaluation and treat  Start:  06/19/22 1700, End:  06/19/22 1700,   ONE TIME,   Standing Count:  1 Occurrences,   Rebeca Bourne MD        Diagnosis:  Trauma [T14.90XA]  Closed fracture of twelfth thoracic vertebra, unspecified fracture morphology, initial encounter ([facility] [S22.950J]   Specific instructions for next treatment:  Increase ambulation distance. Continue transfer training    Current Treatment Recommendations:     [x] Strengthening to improve independence with functional mobility   [x] ROM to improve independence with functional mobility   [x] Balance Training to improve static/dynamic balance and to reduce fall risk  [x] Endurance Training to improve activity tolerance during functional mobility   [x] Transfer Training to improve safety and independence with all functional transfers   [x] Gait Training to improve gait mechanics, endurance and assess need for appropriate assistive device  [x] Stair Training in preparation for safe discharge home and/or into the community   [x] Positioning to prevent skin breakdown and contractures  [x] Safety and Education Training   [x] Patient/Caregiver Education   [] HEP  [] Other     PT long term treatment goals are located in above grid    Frequency of treatments: 5-7x/week x 1-2 weeks. Time in  1440  Time out  1505    Total Treatment Time  25 minutes     Evaluation Time includes thorough review of current medical information, gathering information on past medical history/social history and prior level of function, completion of standardized testing/informal observation of tasks, assessment of data and education on plan of care and goals.     CPT codes:  [] Low Complexity PT evaluation 25553  [] Moderate Complexity PT evaluation 51305  [] High Complexity PT evaluation 53993  [] PT Re-evaluation 87444  [x] Gait training 62055 10 minutes  [] Manual therapy 08481 0 minutes  [x] Therapeutic activities 63585 15 minutes  [] Therapeutic exercises 11818 0 minutes  [] Neuromuscular reeducation 15642 0 minutes       Paddy Samson, [place], [place]  [id number]

## 2022-06-22 NOTE — CARE COORDINATION
Granddaughter Mauriziocrystal Lovell requesting to speak to LSW. Spoke Nena, they are following and concerned about behaviors. Pt had a good night after depakote sprinkles. [facility] declining for [facility], would prefer dementia unit, they do not have a bed. Met with granddaughter Braxton Lovell and pt, pt is alert and oriented, knows where she is, year, how years she has been . Family's second choice is [facility], referral to [facility] await assessment. Charge RN to [employer] for different brace, await to see if pt qualifies, per granddaughter family would pay private if needed. Envelope and ambullete form in soft chart, exemption form started will need completed at discharge. Will need a COVID test day of discharge. 11am spoke with [facility] castro, no beds. Called son Jose Lion to updated. He would like [facility], referral to [employer], await assessment. 11:20am [facility] accepted pt, updated son Tete Tyler. Reggie Galvan, MSW, LSW

## 2022-06-22 NOTE — PROGRESS NOTES
Department of Neurosurgery  Progress Note    CHIEF COMPLAINT: pt seen for T12 fracture    SUBJECTIVE:  C/o back pain    REVIEW OF SYSTEMS :  Constitutional: Negative for chills and fever. Neurological: Negative for dizziness, tremors and speech change.    CVS: negative for chest pain  Resp: negative for SOB    OBJECTIVE:   VITALS:  BP (!) 134/55   Pulse 72   Temp 98.1 °F (36.7 °C) (Temporal)   Resp 16   Ht 5' 5\" (1.651 m)   Wt 108 lb (49 kg)   SpO2 97%   BMI 17.97 kg/m²   PHYSICAL:  CONSTITUTIONAL:  awake, alert, cooperative, no apparent distress, and appears stated age  Alert and oriented X2  PERRLA  ESPINAL 5/5  Sensation intact  Abdomen is soft  Skin is warm       DATA:  CBC:   Lab Results   Component Value Date    WBC 11.5 06/21/2022    RBC 5.09 06/21/2022    HGB 14.8 06/21/2022    HCT 44.1 06/21/2022    MCV 86.6 06/21/2022    MCH 29.1 06/21/2022    MCHC 33.6 06/21/2022    RDW 12.2 06/21/2022     06/21/2022    MPV 9.9 06/21/2022     BMP:    Lab Results   Component Value Date     06/21/2022    K 3.8 06/21/2022    CL 95 06/21/2022    CO2 23 06/21/2022    BUN 25 06/21/2022    LABALBU 4.1 06/19/2022    CREATININE 0.6 06/21/2022    CALCIUM 9.8 06/21/2022    GFRAA >60 06/21/2022    LABGLOM >60 06/21/2022    GLUCOSE 95 06/21/2022     PT/INR:    Lab Results   Component Value Date    PROTIME 29.5 06/19/2022    INR 2.7 06/19/2022     PTT:    Lab Results   Component Value Date    APTT 27.3 06/19/2022   [APTT}    Current Inpatient Medications  Current Facility-Administered Medications: apixaban (ELIQUIS) tablet 2.5 mg, 2.5 mg, Oral, BID  melatonin disintegrating tablet 5 mg, 5 mg, Oral, Nightly  pantoprazole (PROTONIX) tablet 20 mg, 20 mg, Oral, QAM AC  hydrALAZINE (APRESOLINE) injection 10 mg, 10 mg, IntraVENous, Q30 Min PRN  divalproex (DEPAKOTE SPRINKLE) capsule 125 mg, 125 mg, Oral, 2 times per day  metoprolol tartrate (LOPRESSOR) tablet 25 mg, 25 mg, Oral, BID  sodium chloride flush 0.9 % injection 10 mL, 10 mL, IntraVENous, 2 times per day  sodium chloride flush 0.9 % injection 10 mL, 10 mL, IntraVENous, PRN  0.9 % sodium chloride infusion, , IntraVENous, PRN  methocarbamol (ROBAXIN) tablet 1,000 mg, 1,000 mg, Oral, 4x Daily  ondansetron (ZOFRAN-ODT) disintegrating tablet 4 mg, 4 mg, Oral, Q8H PRN **OR** ondansetron (ZOFRAN) injection 4 mg, 4 mg, IntraVENous, Q6H PRN  polyethylene glycol (GLYCOLAX) packet 17 g, 17 g, Oral, Daily  acetaminophen (TYLENOL) tablet 650 mg, 650 mg, Oral, Q6H  oxyCODONE (ROXICODONE) immediate release tablet 2.5 mg, 2.5 mg, Oral, Q4H PRN **OR** [DISCONTINUED] oxyCODONE (ROXICODONE) immediate release tablet 5 mg, 5 mg, Oral, Q4H PRN  bisacodyl (DULCOLAX) EC tablet 5 mg, 5 mg, Oral, Daily    ASSESSMENT:   Acute T12 fracture on MRI    PLAN:  TLSO  WBAT with brace  Follow up in office in 4 weeks with thoracic x-rays  No surgery planned      Electronically signed by BHUMI Nunez on 6/22/2022 at 9:29 AM    I have interviewed and examined the patient and agree with above. I have spent over 20 minutes on medical decision making and in discussing her condition with her.   She has a compression fracture and we will manager this in a Trini Lugo MD plt=14

## 2022-06-22 NOTE — PLAN OF CARE
Problem: Discharge Planning  Goal: Discharge to home or other facility with appropriate resources  6/22/2022 0958 by Kirill Smith RN  Outcome: Progressing  6/21/2022 2319 by Quin Navas RN  Outcome: Progressing     Problem: Safety - Adult  Goal: Free from fall injury  6/22/2022 0958 by Kirill Smith RN  Outcome: Progressing  6/21/2022 2319 by Quin Navas RN  Outcome: Progressing     Problem: Pain  Goal: Verbalizes/displays adequate comfort level or baseline comfort level  6/22/2022 0958 by Kirill Smith RN  Outcome: Progressing  6/21/2022 2319 by Quin Navas RN  Outcome: Progressing     Problem: ABCDS Injury Assessment  Goal: Absence of physical injury  Outcome: Progressing

## 2022-06-22 NOTE — PROGRESS NOTES
Physician Progress Note      PATIENT:               Radha Wynn  CSN #:                  [id number]  :                       1934  ADMIT DATE:       2022 12:02 PM  [address] [facility] DATE:  RESPONDING  PROVIDER #:        Gia Levi MD          QUERY TEXT:    Pt admitted with T 12 traumatic burst fracture. Pt noted to have increased   comfusion. If possible, please document in the progress notes and discharge   summary if you are evaluating and / or treating any of the following: The medical record reflects the following:  Risk Factors: age Robaxin Oxycodone for pain  Clinical Indicators: Increased confusion since admission per  note   : \"is awake, alert, and cooperative. She is now with worsening confusion   and seeing things in the room (dogs/kids). \" GCS 14 on adm  Treatment: Neuro checks Robaxin being held    [facility] BSN CCDS  Options provided:  -- Drug-induced encephalopathy due to robaxin  -- Drug-induced encephalopathy due to, Please specify substance. -- Drug-induced encephalopathy, substance(s) unknown  -- Metabolic encephalopathy  -- Delirium due to, Please specify cause. -- Delirium  -- Other - I will add my own diagnosis  -- Disagree - Not applicable / Not valid  -- Disagree - Clinically unable to determine / Unknown  -- Refer to Clinical Documentation Reviewer    PROVIDER RESPONSE TEXT:    Patient has delirium with unknown cause.     Query created by: Arthur Jain on 2022 12:52 PM      Electronically signed by:  Gia Levi MD 2022 2:36 PM

## 2022-06-22 NOTE — PROGRESS NOTES
Called vega regarding the pt's brace not fitting. Awaiting call back from Luis regarding this. 1055: Luis stated that he will come after later this afternoon to see the pt.

## 2022-06-22 NOTE — PROGRESS NOTES
Occupational Therapy  OT BEDSIDE TREATMENT NOTE   [address] [address] [address], [place], [place]  Patient Name: Lisa Crews  MRN: [id number]  : 1934  Room: [address] [place] [address]     Per OT Eval:    Evaluating OT: Dhruvsaloni Leyva OTR/L; [id number]        Referring Provider: Andrew Pacheco MD    Specific Provider Orders/Date: OT Eval and Treat 22       Diagnosis: Pt fell at home resulting in T12 compression fracture. Surgery: None this admission     Pertinent Medical History:  has a past medical history of Hypertension, Osteoporosis, and TIA (transient ischemic attack).      Recommended Adaptive Equipment: , AE for LE bathing and dressing PRN      Precautions:  Fall Risk, spinal precautions, cognition, +alarms, TLSO      Assessment of current deficits    [x]? Functional mobility            [x]?ADLs           [x]? Strength                  [x]? Cognition    [x]? Functional transfers          [x]? IADLs         [x]? Safety Awareness   [x]? Endurance    []? Fine Coordination                         [x]? Balance      []? Vision/perception   []? Sensation      []? Gross Motor Coordination             []? ROM           []?  Delirium                   []? Motor Control      OT PLAN OF CARE   OT POC based on physician orders, patient diagnosis and results of clinical assessment     Frequency/Duration 3-5 days/wk for 2 weeks PRN   Specific OT Treatment Interventions to include:   * Instruction/training on adapted ADL techniques and AE recommendations to increase functional independence within precautions       * Training on energy conservation strategies, correct breathing pattern and techniques to improve independence/tolerance for self-care routine  * Functional transfer/mobility training/DME recommendations for increased independence, safety, and fall prevention  * Patient/Family education to increase follow through with safety techniques and functional independence  * Recommendation of environmental modifications for increased safety with functional transfers/mobility and ADLs  * Cognitive retraining/development of therapeutic activities to improve problem solving, judgement, memory, and attention for increased safety/participation in ADL/IADL tasks  * Therapeutic exercise to improve motor endurance, ROM, and functional strength for ADLs/functional transfers  * Therapeutic activities to facilitate/challenge dynamic balance, stand tolerance for increased safety and independence with ADLs  * Positioning to improve skin integrity, interaction with environment and functional independence     Home Living: Pt lives with  in 1 story home with 4 steps & 2 handrails to enter. Bathroom setup: [address] owned: Shower chair, ww, quad cane, reacher, sockaid     Prior Level of Function: IND with ADLs , IND with IADLs; engaged in functional mobility with use of  ww  Driving: No  Occupation: None reported     Pain Level: Pt did not complain of pain this session  Cognition: A&O: 4/4; Follows 1 step directions. Pt required mod cuing for sequencing & directional cues of simple, 1-step commands.               Memory:  Fair              Sequencing:  Fair-              Problem solving:  Fair-              Judgement/safety:  Fair-                Functional Assessment:  AM-PAC Daily Activity Raw Score: 14/24    Initial Eval Status  Date: 6/21/22 Treatment Status  Date: 6/22/22 STGs = LTGs  Time frame: 10-14 days   Feeding Stand by Assist   SBA Independent    Grooming Minimal Assist   SBA  Pt washed face, applied deodorant seated upright in chair Modified Ransom    UB Dressing Maximal Assist   maxA  True/doff hospital gown and TLSO brace supine in bed Modified Ransom    LB Dressing Maximal Assist   modA  Pt able to doff socks, requiring assistance to true     DNT pants this date Modified Ransom    Bathing Moderate Assist  maxA  Simulated Task    Pt able to wash of UB, assistance to wash of LB, buttocks and meliza area Modified Hinds    Toileting Maximal Assist  Dependent  Pt requesting to use of bed pan upon arrival, assistance with hygiene    Encouraging pt to use of BSC Modified Hinds    Bed Mobility  Log Roll: Minimal Assist  Supine to sit: Moderate Assist   Sit to supine:NT  modA  Supine<>EOB    Log roll technique, assistance with trunk Supine to sit: Independent   Sit to supine: Independent    Functional Transfers Sit to stand:Minimal Assist   Stand to sit:Minimal Assist  Stand pivot: Minimal Assist  Commode: NT  Kat  Sit to Stand  Stand to Sit    Stand Pivot Transfer EOB<>Chair with w.w. Cueing for hand placement Sit to stand:Modified Hinds    Stand to sit:Modified Hinds   Stand pivot: Modified Hinds   Commode: Modified Hinds     Functional Mobility Minimal Assist  Use of ww shorter than household distance  Kat  Pt ambulated short steps during SPT with w.w.  Modified Hinds with use of Appropriate AD   Balance Sitting:     Static - SBA     Dynamic - Minimal Assist  Standing: Minimal Assist  Sitting EOB:  SBA    Standing:  Kat Sitting:     Static: Independent     Dynamic: Independent  Standing: Modified Hinds    Activity Tolerance Fair  Fair- Good   Visual/  Perceptual Glasses: Yes  Appears WFL          Safety Fair-   Good  during ADL completion following spinal precautions      Hand Dominance Right    AROM (PROM) Strength Additional Info:  Goal:   RUE  WFL 4-/5 grossly tested good  and wfl FMC/dexterity noted during ADL tasks    Improve overall RUE strength WFL for participation in functional tasks      LUE WFL 4-/5 grossly tested Good  and wfl FMC/dexterity noted during ADL tasks    Improve overall LUE strength WFL for participation in functional tasks         Hearing: WFL   Sensation:  No c/o numbness or tingling BUE  Tone: WFL BUE  Edema: Unremarkable         Education:  Pt was educated on role of OT, goals to be reached, importance of OOB activity, precautions to follow, log roll technique with bed mobility, safety and hand placement with transfers, safety and walker management during SPT, techniques to assist with ADL tasks and donning of TLSO brace when sitting upright greater than 45*. Comments: Upon arrival pt supine in bed, agreeable to therapy, visitor present, speaking with nursing okaying pt to be seen this session. Pt completed of bed mobility, functional mobility of short steps, transfers and light ADL tasks this sessions. Pt complaining of TLSO brace not fitting/being uncomfortable throughout session. At end of session, pt seated upright in chair, chair alarm on, all lines and tubes intact, call light within reach, visitor present. · Pt has made fair progress towards set goals.    · Continue with current plan of care focusing on increasing of independency with transfers and ADL tasks      Treatment Time In: 9:45am            Treatment Time Out: 10:10am                Treatment Charges: Mins Units   Ther Ex  07819     Manual Therapy 99127     Thera Activities 96132 8 1   ADL/Home Mgt 19030 17 1   Neuro Re-ed 15337     Group Therapy      Orthotic manage/training  63861     Non-Billable Time     Total Timed Treatment 25 2        Zee Hernández TERAN/L [id number]

## 2022-06-23 VITALS
TEMPERATURE: 97.7 F | WEIGHT: 108 LBS | DIASTOLIC BLOOD PRESSURE: 81 MMHG | BODY MASS INDEX: 17.99 KG/M2 | OXYGEN SATURATION: 100 % | SYSTOLIC BLOOD PRESSURE: 139 MMHG | HEIGHT: 65 IN | HEART RATE: 74 BPM | RESPIRATION RATE: 18 BRPM

## 2022-06-23 LAB — SARS-COV-2, NAAT: NOT DETECTED

## 2022-06-23 PROCEDURE — 6370000000 HC RX 637 (ALT 250 FOR IP)

## 2022-06-23 PROCEDURE — 6370000000 HC RX 637 (ALT 250 FOR IP): Performed by: STUDENT IN AN ORGANIZED HEALTH CARE EDUCATION/TRAINING PROGRAM

## 2022-06-23 PROCEDURE — 87635 SARS-COV-2 COVID-19 AMP PRB: CPT

## 2022-06-23 PROCEDURE — 6370000000 HC RX 637 (ALT 250 FOR IP): Performed by: SURGERY

## 2022-06-23 PROCEDURE — L0464 TLSO 4MOD SACRO-SCAP PRE: HCPCS

## 2022-06-23 PROCEDURE — 6370000000 HC RX 637 (ALT 250 FOR IP): Performed by: CLINICAL NURSE SPECIALIST

## 2022-06-23 RX ORDER — BISACODYL 10 MG
10 SUPPOSITORY, RECTAL RECTAL DAILY PRN
Status: DISCONTINUED | OUTPATIENT
Start: 2022-06-23 | End: 2022-06-23 | Stop reason: HOSPADM

## 2022-06-23 RX ORDER — OXYCODONE HYDROCHLORIDE 5 MG/1
2.5 TABLET ORAL EVERY 6 HOURS PRN
Refills: 0 | Status: SHIPPED | DISCHARGE
Start: 2022-06-23 | End: 2022-06-28

## 2022-06-23 RX ADMIN — PANTOPRAZOLE SODIUM 20 MG: 20 TABLET, DELAYED RELEASE ORAL at 06:24

## 2022-06-23 RX ADMIN — POLYETHYLENE GLYCOL 3350 17 G: 17 POWDER, FOR SOLUTION ORAL at 09:58

## 2022-06-23 RX ADMIN — METOPROLOL TARTRATE 25 MG: 25 TABLET, FILM COATED ORAL at 09:59

## 2022-06-23 RX ADMIN — APIXABAN 2.5 MG: 2.5 TABLET, FILM COATED ORAL at 09:58

## 2022-06-23 RX ADMIN — ACETAMINOPHEN 650 MG: 325 TABLET, FILM COATED ORAL at 11:07

## 2022-06-23 RX ADMIN — BISACODYL 5 MG: 5 TABLET, COATED ORAL at 09:58

## 2022-06-23 RX ADMIN — DIVALPROEX SODIUM 125 MG: 125 CAPSULE, COATED PELLETS ORAL at 09:59

## 2022-06-23 ASSESSMENT — PAIN - FUNCTIONAL ASSESSMENT: PAIN_FUNCTIONAL_ASSESSMENT: PREVENTS OR INTERFERES SOME ACTIVE ACTIVITIES AND ADLS

## 2022-06-23 ASSESSMENT — PAIN DESCRIPTION - ORIENTATION: ORIENTATION: LOWER;POSTERIOR

## 2022-06-23 ASSESSMENT — PAIN SCALES - GENERAL: PAINLEVEL_OUTOF10: 3

## 2022-06-23 ASSESSMENT — PAIN DESCRIPTION - LOCATION: LOCATION: BACK

## 2022-06-23 ASSESSMENT — PAIN DESCRIPTION - DESCRIPTORS: DESCRIPTORS: ACHING;DISCOMFORT;DULL

## 2022-06-23 NOTE — PROGRESS NOTES
Messaged Dr Taco Rivera regarding patient having confusion after taking the Robaxin. Nursing has been holding the medication for past few days. Inquiring if Robaxin can be discontinued at discharge. Also informed Dr Taco Rivera that patient has not had a BM for stated 2 weeks.      5138: RE-directed to Dr Sunil Nichols

## 2022-06-23 NOTE — DISCHARGE INSTR - DIET

## 2022-06-23 NOTE — PLAN OF CARE
Problem: Discharge Planning  Goal: Discharge to home or other facility with appropriate resources  6/23/2022 1034 by Eyal Loyola RN  Outcome: Completed  6/23/2022 0304 by Mary Marlow RN  Outcome: Progressing     Problem: Safety - Adult  Goal: Free from fall injury  6/23/2022 1034 by Eyal Loyola RN  Outcome: Completed  6/23/2022 0304 by Mary Marlow RN  Outcome: Progressing     Problem: Pain  Goal: Verbalizes/displays adequate comfort level or baseline comfort level  6/23/2022 1034 by Eyal Loyola RN  Outcome: Completed  6/23/2022 0304 by Mary Marlow RN  Outcome: Progressing     Problem: ABCDS Injury Assessment  Goal: Absence of physical injury  6/23/2022 1034 by Eyal Loyola RN  Outcome: Completed  6/23/2022 0304 by Mary Marlow RN  Outcome: Progressing

## 2022-06-23 NOTE — DISCHARGE INSTR - COC
Continuity of Care Form    Patient Name: Be Arellano   :  1934  MRN:  [id number]    Admit date:  2022  Discharge date:  2022    Code Status Order: Full Code   Advance Directives:      Admitting Physician:  Erin Stevenson MD  PCP: Don Loredo MD    Discharging Nurse: Jesse Finch 23 Unit/Room#: 7384/8134-M  Discharging Unit Phone Number: [phone]      Emergency Contact:   Extended Emergency Contact Information  Primary Emergency Contact: Manuel Narayanan  Address: [address] [place] [address], [address] Phone: [phone]  Mobile Phone: [phone]  Relation: Spouse  Secondary Emergency Contact: Janes Rosas  Address: [address] [place] [address], [address] Phone: [phone]  Mobile Phone: [phone]  Relation: Child    Past Surgical History:  Past Surgical History:   Procedure Laterality Date    APPENDECTOMY      HEMIARTHROPLASTY HIP Left 2019    LEFT  HIP HEMIARTHROPLASTY (MARTA) performed by Katelyn Kendall MD at [address] [place] [address] History: There is no immunization history on file for this patient.     Active Problems:  Patient Active Problem List   Diagnosis Code    Closed subcapital fracture of left femur ([facility] [address] S72.012A    HTN (hypertension), benign I10    Severe protein-calorie malnutrition (HCC) E43    Paroxysmal atrial fibrillation (HCC) I48.0    Trauma T14.90XA    T12 compression fracture (HCC) S22.080A    Closed fracture of twelfth thoracic vertebra (HCC) S22.089A       Isolation/Infection:   Isolation            No Isolation          Patient Infection Status       None to display            Nurse Assessment:  Last Vital Signs: /81   Pulse 74   Temp 97.7 °F (36.5 °C) (Temporal)   Resp 18   Ht 5' 5\" (1.651 m)   Wt 108 lb (49 kg)   SpO2 100%   BMI 17.97 kg/m²     Last documented pain score (0-10 scale): Pain Level: 3  Last Weight:   Wt Readings from Last 1 Encounters:   06/19/22 108 lb (49 kg)     Mental Status:  alert and oriented to self, place at times, and at times time    IV Access:  - None    Nursing Mobility/ADLs:  Walking   Assisted  Transfer  Assisted  Bathing  Assisted  Dressing  Assisted  Toileting  Assisted  Feeding  Independent  Med Admin  Assisted  Med Delivery   crushed and pudding however able to take whole pills too    Wound Care Documentation and Therapy:  Incision 02/26/19 Hip Left (Active)   Number of days: 1212        Elimination:  Continence: Bowel: Yes  Bladder: Yes  Urinary Catheter: None   Colostomy/Ileostomy/Ileal Conduit: No       Date of Last BM: 06/23/2022    Intake/Output Summary (Last 24 hours) at 6/23/2022 0904  Last data filed at 6/22/2022 1831  Gross per 24 hour   Intake 420 ml   Output 100 ml   Net 320 ml     I/O last 3 completed shifts: In: 720 [P.O.:720]  Out: 100 [Urine:100]    Safety Concerns:     History of Falls (last 30 days) and At Risk for Falls    Impairments/Disabilities:      None    Nutrition Therapy:  Current Nutrition Therapy:   - Oral Diet:  General    Routes of Feeding: Oral  Liquids: No Restrictions  Daily Fluid Restriction: no  Last Modified Barium Swallow with Video (Video Swallowing Test): not done    Treatments at the Time of Hospital Discharge:   Respiratory Treatments: none  Oxygen Therapy:  is not on home oxygen therapy.   Ventilator:    - No ventilator support    Rehab Therapies: Physical Therapy, Occupational Therapy, and Orthotics/Prosthetics  Weight Bearing Status/Restrictions: No weight bearing restrictions  Other Medical Equipment (for information only, NOT a DME order):  walker, bedside commode, hospital bed, and braces soft TLSO brace   Other Treatments: ***    Patient's personal belongings (please select all that are sent with patient):  Glasses, partial upper     RN SIGNATURE:  Electronically signed by Anat Chavez RN on 6/23/22 at 9:07 AM EDT    CASE MANAGEMENT/SOCIAL WORK SECTION    Inpatient Status Date: ***    Readmission Risk Assessment Score:  Readmission Risk              Risk of Unplanned Readmission:  13           Discharging to Facility/ Agency   Name:   Address:  Phone:  Fax:    Dialysis Facility (if applicable)   Name:  Address:  Dialysis Schedule:  Phone:  Fax:    / signature: {Monaature:[id number]}    PHYSICIAN SECTION    Prognosis: {Prognosis:7237850245}    Condition at Discharge: [facility] Patient Condition:[id number]}    Rehab Potential (if transferring to Rehab): {Prognosis:3361183410}    Recommended Labs or Other Treatments After Discharge: ***    Physician Certification: I certify the above information and transfer of Raquel Taylor  is necessary for the continuing treatment of the diagnosis listed and that she requires {Admit to Appropriate Level of Care:35048} for {GREATER/LESS:658998015} 30 days.      Update Admission H&P: {CHP DME Changes in LQSGJ:804279180}    PHYSICIAN SIGNATURE:  {Esignature:[id number]}

## 2022-06-23 NOTE — CARE COORDINATION
6/23/22 Update CM note; [facility] to transport via stretcher with pickup time 12:00pm today to [facility] skilled rehab. covid is currently pending. Hens/envelope with ambulance forms/namrata/destination completed. Envelope in soft chart. Spoke with , Sharad Tillman, and notified him of time of pickup. He states he will touch base with [place] in the afternoon.  Electronically signed by Jignesh Fairbanks RN CM on 6/23/2022 at 9:35 AM

## 2022-07-20 ENCOUNTER — OFFICE VISIT (OUTPATIENT)
Dept: NEUROSURGERY | Age: 87
End: 2022-07-20
Payer: MEDICARE

## 2022-07-20 ENCOUNTER — HOSPITAL ENCOUNTER (OUTPATIENT)
Age: 87
Discharge: HOME | End: 2022-07-22
Payer: COMMERCIAL

## 2022-07-20 ENCOUNTER — HOSPITAL ENCOUNTER (OUTPATIENT)
Dept: GENERAL RADIOLOGY | Age: 87
Discharge: HOME | End: 2022-07-22
Payer: COMMERCIAL

## 2022-07-20 VITALS
OXYGEN SATURATION: 97 % | HEART RATE: 107 BPM | HEIGHT: 65 IN | BODY MASS INDEX: 17.99 KG/M2 | DIASTOLIC BLOOD PRESSURE: 82 MMHG | WEIGHT: 108 LBS | TEMPERATURE: 98.2 F | RESPIRATION RATE: 20 BRPM | SYSTOLIC BLOOD PRESSURE: 146 MMHG

## 2022-07-20 DIAGNOSIS — S22.080D COMPRESSION FRACTURE OF T12 VERTEBRA WITH ROUTINE HEALING, SUBSEQUENT ENCOUNTER: ICD-10-CM

## 2022-07-20 DIAGNOSIS — S22.080D COMPRESSION FRACTURE OF T12 VERTEBRA WITH ROUTINE HEALING, SUBSEQUENT ENCOUNTER: Primary | ICD-10-CM

## 2022-07-20 PROCEDURE — 99213 OFFICE O/P EST LOW 20 MIN: CPT | Performed by: PHYSICIAN ASSISTANT

## 2022-07-20 PROCEDURE — 72070 X-RAY EXAM THORAC SPINE 2VWS: CPT

## 2022-07-20 PROCEDURE — G8419 CALC BMI OUT NRM PARAM NOF/U: HCPCS | Performed by: PHYSICIAN ASSISTANT

## 2022-07-20 PROCEDURE — G8427 DOCREV CUR MEDS BY ELIG CLIN: HCPCS | Performed by: PHYSICIAN ASSISTANT

## 2022-07-20 PROCEDURE — 1090F PRES/ABSN URINE INCON ASSESS: CPT | Performed by: PHYSICIAN ASSISTANT

## 2022-07-20 PROCEDURE — 1123F ACP DISCUSS/DSCN MKR DOCD: CPT | Performed by: PHYSICIAN ASSISTANT

## 2022-07-20 PROCEDURE — 1111F DSCHRG MED/CURRENT MED MERGE: CPT | Performed by: PHYSICIAN ASSISTANT

## 2022-07-20 PROCEDURE — 1036F TOBACCO NON-USER: CPT | Performed by: PHYSICIAN ASSISTANT

## 2022-07-20 NOTE — PROGRESS NOTES
Patient is here for follow up for L1 compression fracture. Minimal back pain. Physical exam  Alert and Oriented X3  PERRLA, EOMI  ESPINAL 4/5  Sensation intact to LT and PP  Reflexes are 2+ and symmetric    A/P: patient is here for follow up for: L1 compression fracture. Continue with TLSO. Will order lumbar x-rays . F/u in 2 months.

## 2022-09-19 DIAGNOSIS — S22.080D COMPRESSION FRACTURE OF T12 VERTEBRA WITH ROUTINE HEALING, SUBSEQUENT ENCOUNTER: Primary | ICD-10-CM

## 2022-09-27 ENCOUNTER — HOSPITAL ENCOUNTER (OUTPATIENT)
Age: 87
Discharge: HOME | End: 2022-09-29
Payer: MEDICARE

## 2022-09-27 ENCOUNTER — OFFICE VISIT (OUTPATIENT)
Dept: NEUROSURGERY | Age: 87
End: 2022-09-27
Payer: MEDICARE

## 2022-09-27 ENCOUNTER — HOSPITAL ENCOUNTER (OUTPATIENT)
Dept: GENERAL RADIOLOGY | Age: 87
Discharge: HOME | End: 2022-09-29
Payer: MEDICARE

## 2022-09-27 VITALS
TEMPERATURE: 97.2 F | RESPIRATION RATE: 14 BRPM | HEIGHT: 65 IN | BODY MASS INDEX: 17.99 KG/M2 | DIASTOLIC BLOOD PRESSURE: 95 MMHG | HEART RATE: 63 BPM | SYSTOLIC BLOOD PRESSURE: 162 MMHG | OXYGEN SATURATION: 95 % | WEIGHT: 108 LBS

## 2022-09-27 DIAGNOSIS — S22.080D COMPRESSION FRACTURE OF T12 VERTEBRA WITH ROUTINE HEALING, SUBSEQUENT ENCOUNTER: Primary | ICD-10-CM

## 2022-09-27 DIAGNOSIS — S22.080D COMPRESSION FRACTURE OF T12 VERTEBRA WITH ROUTINE HEALING, SUBSEQUENT ENCOUNTER: ICD-10-CM

## 2022-09-27 PROCEDURE — 1123F ACP DISCUSS/DSCN MKR DOCD: CPT | Performed by: PHYSICIAN ASSISTANT

## 2022-09-27 PROCEDURE — 99212 OFFICE O/P EST SF 10 MIN: CPT

## 2022-09-27 PROCEDURE — G8427 DOCREV CUR MEDS BY ELIG CLIN: HCPCS | Performed by: PHYSICIAN ASSISTANT

## 2022-09-27 PROCEDURE — 72100 X-RAY EXAM L-S SPINE 2/3 VWS: CPT

## 2022-09-27 PROCEDURE — G8419 CALC BMI OUT NRM PARAM NOF/U: HCPCS | Performed by: PHYSICIAN ASSISTANT

## 2022-09-27 PROCEDURE — 99213 OFFICE O/P EST LOW 20 MIN: CPT | Performed by: PHYSICIAN ASSISTANT

## 2022-09-27 PROCEDURE — 1036F TOBACCO NON-USER: CPT | Performed by: PHYSICIAN ASSISTANT

## 2022-09-27 PROCEDURE — 1090F PRES/ABSN URINE INCON ASSESS: CPT | Performed by: PHYSICIAN ASSISTANT

## 2022-09-27 NOTE — PROGRESS NOTES
Patient is here for follow up for L1 compression fracture. Minimal back pain. Physical exam  Alert and Oriented X3  PERRLA, EOMI  ESPINAL 4/5  Sensation intact to LT and PP  Reflexes are 2+ and symmetric    A/P: patient is here for follow up for: L1 compression fracture. discontinue TLSO. Lumbar x-rays stable. F/u PRN. No restrictions.

## 2022-10-16 ENCOUNTER — APPOINTMENT (OUTPATIENT)
Dept: GENERAL RADIOLOGY | Age: 87
DRG: 536 | End: 2022-10-16
Payer: MEDICARE

## 2022-10-16 ENCOUNTER — HOSPITAL ENCOUNTER (INPATIENT)
Age: 87
LOS: 2 days | Discharge: NURSING, REHAB OR LONG-TERM CARE | DRG: 536 | End: 2022-10-18
Attending: EMERGENCY MEDICINE | Admitting: INTERNAL MEDICINE
Payer: MEDICARE

## 2022-10-16 DIAGNOSIS — S32.810A CLOSED PELVIC RING FRACTURE, INITIAL ENCOUNTER (HCC): ICD-10-CM

## 2022-10-16 DIAGNOSIS — S32.810A PELVIC RING FRACTURE, CLOSED, INITIAL ENCOUNTER (HCC): Primary | ICD-10-CM

## 2022-10-16 LAB
ANION GAP SERPL CALCULATED.3IONS-SCNC: 14 MMOL/L (ref 7–16)
BUN BLDV-MCNC: 19 MG/DL (ref 6–23)
CALCIUM SERPL-MCNC: 10.4 MG/DL (ref 8.6–10.2)
CHLORIDE BLD-SCNC: 97 MMOL/L (ref 98–107)
CO2: 23 MMOL/L (ref 22–29)
CREAT SERPL-MCNC: 0.8 MG/DL (ref 0.5–1)
GFR AFRICAN AMERICAN: >60
GFR NON-AFRICAN AMERICAN: >60 ML/MIN/1.73
GLUCOSE BLD-MCNC: 119 MG/DL (ref 74–99)
HCT VFR BLD CALC: 38.2 % (ref 34–48)
HEMOGLOBIN: 12.6 G/DL (ref 11.5–15.5)
MCH RBC QN AUTO: 30.1 PG (ref 26–35)
MCHC RBC AUTO-ENTMCNC: 33 % (ref 32–34.5)
MCV RBC AUTO: 91.4 FL (ref 80–99.9)
PDW BLD-RTO: 12.8 FL (ref 11.5–15)
PLATELET # BLD: 236 E9/L (ref 130–450)
PMV BLD AUTO: 9.7 FL (ref 7–12)
POTASSIUM SERPL-SCNC: 4.4 MMOL/L (ref 3.5–5)
RBC # BLD: 4.18 E12/L (ref 3.5–5.5)
SODIUM BLD-SCNC: 134 MMOL/L (ref 132–146)
TROPONIN, HIGH SENSITIVITY: 14 NG/L (ref 0–9)
WBC # BLD: 9.7 E9/L (ref 4.5–11.5)

## 2022-10-16 PROCEDURE — 1200000000 HC SEMI PRIVATE

## 2022-10-16 PROCEDURE — 93005 ELECTROCARDIOGRAM TRACING: CPT | Performed by: EMERGENCY MEDICINE

## 2022-10-16 PROCEDURE — 99285 EMERGENCY DEPT VISIT HI MDM: CPT

## 2022-10-16 PROCEDURE — 6360000002 HC RX W HCPCS: Performed by: EMERGENCY MEDICINE

## 2022-10-16 PROCEDURE — 85027 COMPLETE CBC AUTOMATED: CPT

## 2022-10-16 PROCEDURE — 73502 X-RAY EXAM HIP UNI 2-3 VIEWS: CPT

## 2022-10-16 PROCEDURE — 71045 X-RAY EXAM CHEST 1 VIEW: CPT

## 2022-10-16 PROCEDURE — 80048 BASIC METABOLIC PNL TOTAL CA: CPT

## 2022-10-16 PROCEDURE — 72190 X-RAY EXAM OF PELVIS: CPT

## 2022-10-16 PROCEDURE — 84484 ASSAY OF TROPONIN QUANT: CPT

## 2022-10-16 PROCEDURE — 96374 THER/PROPH/DIAG INJ IV PUSH: CPT

## 2022-10-16 RX ORDER — ONDANSETRON 2 MG/ML
4 INJECTION INTRAMUSCULAR; INTRAVENOUS ONCE
Status: COMPLETED | OUTPATIENT
Start: 2022-10-16 | End: 2022-10-16

## 2022-10-16 RX ORDER — MORPHINE SULFATE 4 MG/ML
4 INJECTION, SOLUTION INTRAMUSCULAR; INTRAVENOUS ONCE
Status: COMPLETED | OUTPATIENT
Start: 2022-10-16 | End: 2022-10-16

## 2022-10-16 RX ORDER — FENTANYL CITRATE 50 UG/ML
50 INJECTION, SOLUTION INTRAMUSCULAR; INTRAVENOUS ONCE
Status: COMPLETED | OUTPATIENT
Start: 2022-10-16 | End: 2022-10-16

## 2022-10-16 RX ADMIN — ONDANSETRON 4 MG: 2 INJECTION INTRAMUSCULAR; INTRAVENOUS at 22:28

## 2022-10-16 RX ADMIN — MORPHINE SULFATE 4 MG: 4 INJECTION, SOLUTION INTRAMUSCULAR; INTRAVENOUS at 22:28

## 2022-10-16 RX ADMIN — FENTANYL CITRATE 50 MCG: 50 INJECTION, SOLUTION INTRAMUSCULAR; INTRAVENOUS at 20:28

## 2022-10-16 ASSESSMENT — PAIN DESCRIPTION - LOCATION
LOCATION: PELVIS;HIP
LOCATION: PELVIS;HIP

## 2022-10-16 ASSESSMENT — PAIN DESCRIPTION - ORIENTATION: ORIENTATION: RIGHT

## 2022-10-16 ASSESSMENT — PAIN SCALES - GENERAL
PAINLEVEL_OUTOF10: 10
PAINLEVEL_OUTOF10: 10

## 2022-10-17 ENCOUNTER — APPOINTMENT (OUTPATIENT)
Dept: CT IMAGING | Age: 87
DRG: 536 | End: 2022-10-17
Payer: MEDICARE

## 2022-10-17 LAB
EKG ATRIAL RATE: 78 BPM
EKG P AXIS: 91 DEGREES
EKG P-R INTERVAL: 134 MS
EKG Q-T INTERVAL: 380 MS
EKG QRS DURATION: 58 MS
EKG QTC CALCULATION (BAZETT): 433 MS
EKG R AXIS: 6 DEGREES
EKG T AXIS: 57 DEGREES
EKG VENTRICULAR RATE: 78 BPM

## 2022-10-17 PROCEDURE — 97161 PT EVAL LOW COMPLEX 20 MIN: CPT

## 2022-10-17 PROCEDURE — 6370000000 HC RX 637 (ALT 250 FOR IP)

## 2022-10-17 PROCEDURE — 6360000002 HC RX W HCPCS

## 2022-10-17 PROCEDURE — 1200000000 HC SEMI PRIVATE

## 2022-10-17 PROCEDURE — 97530 THERAPEUTIC ACTIVITIES: CPT

## 2022-10-17 PROCEDURE — 2580000003 HC RX 258

## 2022-10-17 PROCEDURE — 72192 CT PELVIS W/O DYE: CPT

## 2022-10-17 RX ORDER — DOCUSATE SODIUM 100 MG/1
100 CAPSULE, LIQUID FILLED ORAL DAILY
Status: DISCONTINUED | OUTPATIENT
Start: 2022-10-17 | End: 2022-10-18 | Stop reason: HOSPADM

## 2022-10-17 RX ORDER — POLYETHYLENE GLYCOL 3350 17 G/17G
17 POWDER, FOR SOLUTION ORAL DAILY PRN
Status: DISCONTINUED | OUTPATIENT
Start: 2022-10-17 | End: 2022-10-18 | Stop reason: HOSPADM

## 2022-10-17 RX ORDER — SODIUM CHLORIDE 9 MG/ML
INJECTION, SOLUTION INTRAVENOUS PRN
Status: DISCONTINUED | OUTPATIENT
Start: 2022-10-17 | End: 2022-10-18 | Stop reason: HOSPADM

## 2022-10-17 RX ORDER — ACETAMINOPHEN 650 MG/1
650 SUPPOSITORY RECTAL EVERY 6 HOURS PRN
Status: DISCONTINUED | OUTPATIENT
Start: 2022-10-17 | End: 2022-10-18 | Stop reason: HOSPADM

## 2022-10-17 RX ORDER — HYDROCODONE BITARTRATE AND ACETAMINOPHEN 5; 325 MG/1; MG/1
1 TABLET ORAL EVERY 6 HOURS PRN
Qty: 12 TABLET | Refills: 0 | Status: SHIPPED | OUTPATIENT
Start: 2022-10-17 | End: 2022-10-20

## 2022-10-17 RX ORDER — ONDANSETRON 2 MG/ML
4 INJECTION INTRAMUSCULAR; INTRAVENOUS EVERY 6 HOURS PRN
Status: DISCONTINUED | OUTPATIENT
Start: 2022-10-17 | End: 2022-10-18 | Stop reason: HOSPADM

## 2022-10-17 RX ORDER — ACETAMINOPHEN 325 MG/1
650 TABLET ORAL EVERY 6 HOURS PRN
Status: DISCONTINUED | OUTPATIENT
Start: 2022-10-17 | End: 2022-10-18 | Stop reason: HOSPADM

## 2022-10-17 RX ORDER — SODIUM CHLORIDE 0.9 % (FLUSH) 0.9 %
5-40 SYRINGE (ML) INJECTION EVERY 12 HOURS SCHEDULED
Status: DISCONTINUED | OUTPATIENT
Start: 2022-10-17 | End: 2022-10-18 | Stop reason: HOSPADM

## 2022-10-17 RX ORDER — MORPHINE SULFATE 2 MG/ML
2 INJECTION, SOLUTION INTRAMUSCULAR; INTRAVENOUS EVERY 4 HOURS PRN
Status: DISCONTINUED | OUTPATIENT
Start: 2022-10-17 | End: 2022-10-18 | Stop reason: HOSPADM

## 2022-10-17 RX ORDER — POTASSIUM CHLORIDE 7.45 MG/ML
10 INJECTION INTRAVENOUS PRN
Status: DISCONTINUED | OUTPATIENT
Start: 2022-10-17 | End: 2022-10-17

## 2022-10-17 RX ORDER — POTASSIUM CHLORIDE 20 MEQ/1
40 TABLET, EXTENDED RELEASE ORAL PRN
Status: DISCONTINUED | OUTPATIENT
Start: 2022-10-17 | End: 2022-10-17

## 2022-10-17 RX ORDER — SODIUM CHLORIDE 9 MG/ML
INJECTION, SOLUTION INTRAVENOUS CONTINUOUS
Status: DISCONTINUED | OUTPATIENT
Start: 2022-10-17 | End: 2022-10-17

## 2022-10-17 RX ORDER — LABETALOL 100 MG/1
100 TABLET, FILM COATED ORAL 2 TIMES DAILY
Status: DISCONTINUED | OUTPATIENT
Start: 2022-10-17 | End: 2022-10-18 | Stop reason: HOSPADM

## 2022-10-17 RX ORDER — LANOLIN ALCOHOL/MO/W.PET/CERES
5 CREAM (GRAM) TOPICAL NIGHTLY
Status: DISCONTINUED | OUTPATIENT
Start: 2022-10-17 | End: 2022-10-18 | Stop reason: HOSPADM

## 2022-10-17 RX ORDER — PANTOPRAZOLE SODIUM 20 MG/1
20 TABLET, DELAYED RELEASE ORAL
Status: DISCONTINUED | OUTPATIENT
Start: 2022-10-17 | End: 2022-10-18 | Stop reason: HOSPADM

## 2022-10-17 RX ORDER — ONDANSETRON 4 MG/1
4 TABLET, ORALLY DISINTEGRATING ORAL EVERY 8 HOURS PRN
Status: DISCONTINUED | OUTPATIENT
Start: 2022-10-17 | End: 2022-10-18 | Stop reason: HOSPADM

## 2022-10-17 RX ORDER — SODIUM CHLORIDE 0.9 % (FLUSH) 0.9 %
10 SYRINGE (ML) INJECTION PRN
Status: DISCONTINUED | OUTPATIENT
Start: 2022-10-17 | End: 2022-10-18 | Stop reason: HOSPADM

## 2022-10-17 RX ADMIN — PANTOPRAZOLE SODIUM 20 MG: 20 TABLET, DELAYED RELEASE ORAL at 10:13

## 2022-10-17 RX ADMIN — LABETALOL HYDROCHLORIDE 100 MG: 100 TABLET, FILM COATED ORAL at 21:35

## 2022-10-17 RX ADMIN — METOPROLOL TARTRATE 25 MG: 25 TABLET, FILM COATED ORAL at 21:35

## 2022-10-17 RX ADMIN — APIXABAN 2.5 MG: 2.5 TABLET, FILM COATED ORAL at 21:34

## 2022-10-17 RX ADMIN — DOCUSATE SODIUM 100 MG: 100 CAPSULE, LIQUID FILLED ORAL at 10:13

## 2022-10-17 RX ADMIN — SODIUM CHLORIDE: 9 INJECTION, SOLUTION INTRAVENOUS at 01:57

## 2022-10-17 RX ADMIN — LABETALOL HYDROCHLORIDE 100 MG: 100 TABLET, FILM COATED ORAL at 10:13

## 2022-10-17 RX ADMIN — METOPROLOL TARTRATE 25 MG: 25 TABLET, FILM COATED ORAL at 10:13

## 2022-10-17 RX ADMIN — APIXABAN 2.5 MG: 2.5 TABLET, FILM COATED ORAL at 10:13

## 2022-10-17 RX ADMIN — ONDANSETRON 4 MG: 2 INJECTION INTRAMUSCULAR; INTRAVENOUS at 01:58

## 2022-10-17 RX ADMIN — Medication 4.5 MG: at 21:35

## 2022-10-17 NOTE — H&P
Hospital Medicine History & Physical      PCP: Ainsley Parra MD    Date of Admission: 10/16/2022    Date of Service: . OCT 17, 2022    Chief Complaint:   FALL      History Of Present Illness:     80 y.o. female presented with  FALL, [address], DOES NOT KNOW HOW,  DENIES STRIKING HEAD, BUT FELL ON HER BUTTOCK AND SUSTAIN AND PUBIC RAMI FRACTURE. Past Medical History:          Diagnosis Date    Hypertension     Osteoporosis     TIA (transient ischemic attack)        Past Surgical History:          Procedure Laterality Date    APPENDECTOMY      HEMIARTHROPLASTY HIP Left 2/26/2019    LEFT  HIP HEMIARTHROPLASTY (MARTA) performed by Giselle Mitchell MD at [facility] OR       Medications Prior to Admission:      Prior to Admission medications    Medication Sig Start Date End Date Taking? Authorizing Provider   HYDROcodone-acetaminophen (NORCO) 5-325 MG per tablet Take 1 tablet by mouth every 6 hours as needed for Pain for up to 3 days. Intended supply: 3 days.  Take lowest dose possible to manage pain 10/17/22 10/20/22 Yes Dean Chun DO   divalproex (DEPAKOTE SPRINKLE) 125 MG capsule Take 1 capsule by mouth every 12 hours 6/22/22   Jennifer Callejas MD   ondansetron (ZOFRAN-ODT) 4 MG disintegrating tablet Take 1 tablet by mouth every 8 hours as needed for Nausea or Vomiting 6/22/22   Jennifer Callejas MD   ondansetron [facility]) 4 MG/2ML injection Infuse 2 mLs intravenously every 6 hours as needed for Nausea or Vomiting 6/22/22   Jennifer Callejas MD   bisacodyl (DULCOLAX) 5 MG EC tablet Take 1 tablet by mouth daily 6/23/22   Jennifer Callejas MD   melatonin 5 MG TBDP disintegrating tablet Take 1 tablet by mouth nightly 6/22/22   Jennifer Callejas MD   pantoprazole (PROTONIX) 20 MG tablet Take 1 tablet by mouth every morning (before breakfast) 6/23/22 Betsey Metcalf MD   labetalol (NORMODYNE) 100 MG tablet Take 100 mg by mouth 2 times daily    Historical Provider, MD   docusate sodium (COLACE, DULCOLAX) 100 MG CAPS Take 100 mg by mouth daily 3/1/19   Manny Crowder MD   [facility]) 2.5 MG TABS tablet Take 1 tablet by mouth 2 times daily 3/5/18   Manny Crowder MD   metoprolol tartrate (LOPRESSOR) 25 MG tablet Take 1 tablet by mouth 2 times daily 3/5/18   Manny Crowder MD   mirabegron (MYRBETRIQ) 25 MG TB24 Take by mouth every 48 hours as needed     Historical Provider, MD       Allergies:  Patient has no known allergies. Social History:      The patient currently lives     TOBACCO:   reports that she has never smoked. She has never used smokeless tobacco.  ETOH:   reports no history of alcohol use. Family History:    No family history on file. REVIEW OF SYSTEMS:   Pertinent positives as noted in the HPI. All other systems reviewed and negative. PHYSICAL EXAM:    /88   Pulse 86   Temp 97.9 °F (36.6 °C) (Oral)   Resp 16   Ht 5' 5\" (1.651 m)   Wt 108 lb (49 kg)   SpO2 95%   BMI 17.97 kg/m²     General appearance:  No apparent distress, appears stated age. HEENT:  Normal cephalic, atraumatic without obvious deformity. Pupils equal, round, and reactive to light. Extra ocular muscles intact. Conjunctivae/corneas clear. Neck: Supple, with full range of motion. No jugular venous distention. Trachea midline. Respiratory:  Normal respiratory effort. Clear to auscultation,   Cardiovascular:  IRREG  Abdomen: Soft, non-tender, non-distended with normal bowel sounds. Musculoskeletal:  No clubbing, cyanosis or edema bilaterally.     Skin: smooth and dry   Neurologic:   Cranial nerves: II-XII intact,   Psychiatric:  Alert and oriented x 3        Labs:     Recent Labs     10/16/22  2024   WBC 9.7   HGB 12.6   HCT 38.2        Recent Labs     10/16/22  2024      K 4.4   CL 97*   CO2 23   BUN 19   CREATININE 0.8 CALCIUM 10.4*     No results for input(s): AST, ALT, BILIDIR, BILITOT, ALKPHOS in the last 72 hours. No results for input(s): INR in the last 72 hours. No results for input(s): [place] in the last 72 hours. Urinalysis:      Lab Results   Component Value Date/Time    NITRU Negative 06/19/2022 04:07 AM    WBCUA 1-3 06/19/2022 04:07 AM    BACTERIA RARE 06/19/2022 04:07 AM    RBCUA 2-5 06/19/2022 04:07 AM    BLOODU MODERATE 06/19/2022 04:07 AM    SPECGRAV 1.010 06/19/2022 04:07 AM    GLUCOSEU Negative 06/19/2022 04:07 AM       Radiology:           CT PELVIS WO CONTRAST Additional Contrast? None   Final Result   Acute fractures of the right pubic bone. Acute fracture of right sacral wing. Stable appearance for the right and left hip prosthesis. Generalized osteopenia visualized bones structures. Degenerative changes of the facet joints of L4-5 level with grade 1   anterolisthesis and with severe degree of spinal canal stenosis present at   this level. XR PELVIS (MIN 3 VIEWS)   Final Result   Acute fracture of the right pubic body and inferior pubic ramus. XR CHEST PORTABLE   Final Result   No acute process. XR HIP 2-3 VW W PELVIS RIGHT   Final Result   Addendum (preliminary) 1 of 1   ADDENDUM:   Upon further review and discussing the case with the ordering provider,    there   is suspicion for right pubic fractures near the symphysis. However,   evaluation is limited by overlying catheter and stool. Possible right pubic bone fracture. Repeat pelvic x-rays with removal of    the   overlying catheter have been ordered. Findings were discussed with Vijaya Martin at 7:28 pm on 10/16/2022. Final   No acute osseous abnormality. ASSESSMENT:    Active Hospital Problems    Diagnosis Date Noted    Pelvic ring fracture, closed, initial encounter ([facility] [address] Queenie Queen 10/16/2022     Priority: Medium   PAF  T12 FRACTURE?  IF OLD OR NEW HYPERCALCEMIA   HTN         PLAN:  LOPRESSOR   ORTHO   IONIZED CALCIUM      DVT Prophylaxis: ELIQUIS  Diet: ADULT DIET; Regular  Code Status: Full Code    PT/OT Eval Status:  ORDERED    Dispo - NAKUL    Electronically signed by Larissa Prieto DO on 10/17/2022 at 2:22 PM ARMIDA       Thank you Ainsley Parra MD for the opportunity to be involved in this patient's care.  If you have any questions or concerns please feel free to contact me at [phone]

## 2022-10-17 NOTE — CONSULTS
Ortho consult    Requested by Dr. Irma Anand    Pain score 6/10    CC right groin pain    Reason for consult - right pelvic fx         Sb Rodriguez is a 80 y.o. female presenting to the ED for fall right hip pain, beginning 2 hours ago. The complaint has been persistent, moderate in severity, and worsened by standing. Patient stated she had a mechanical fall tonight she got up tripped over her walker landing on her right hip. She reports previous replacement of the hip. Denies hitting her head. She has had no chest pain or shortness of breath heart chest wall injury. She has no back pain or other neurological complaints. She is unable to stand or bear weight. She called her son who found her on the floor this morning around 630. Has no fevers or chills. She denies shortness of breath abdominal pain nausea vomiting. Pain is sharp. Pain is the same. Better with rest.  Worse with activity. Uses a walker     Review of Systems:   Pertinent positives and negatives are stated within HPI, all other systems reviewed and are negative.     --------------------------------------------- PAST HISTORY ---------------------------------------------  Past Medical History:  has a past medical history of Hypertension, Osteoporosis, and TIA (transient ischemic attack). Past Surgical History:  has a past surgical history that includes Appendectomy and HEMIARTHROPLASTY HIP (Left, 2/26/2019). Social History:  reports that she has never smoked. She has never used smokeless tobacco. She reports that she does not drink alcohol and does not use drugs. Family History: family history is not on file. The patients home medications have been reviewed. Allergies: Patient has no known allergies. ---------------------------------------------------PHYSICAL EXAM--------------------------------------     Constitutional/General: Alert and oriented x3, patient appears in mild distress. She is thin for age.   Head: Normocephalic and atraumatic  Eyes: PERRL, EOMI, conjunctive normal, sclera non icteric  Mouth: Oropharynx clear, handling secretions, no trismus, no asymmetry of the posterior oropharynx or uvular edema  Neck: Supple, full ROM, non tender to palpation in the midline, no stridor, no crepitus, no meningeal signs  Respiratory: Lungs clear to auscultation bilaterally, no wheezes, rales, or rhonchi. Not in respiratory distress  Cardiovascular:  Regular rate. Regular rhythm. No murmurs, gallops, or rubs. 2+ distal pulses  Chest: No chest wall tenderness no chest wall bony crepitus or tenderness. GI:  Abdomen Soft, Non tender, Non distended. +BS. No organomegaly, no palpable masses,  No rebound, guarding, or rigidity. Musculoskeletal: Moves all extremities x 4. Warm and well perfused, no clubbing, cyanosis, or edema. Capillary refill <3 seconds, patient has tenderness to palpation to the right lateral hip. She is guarding the right buttocks with her right hand. Integument: skin warm and dry. No rashes. Lymphatic: no lymphadenopathy noted  Neurologic: GCS 15, no focal deficits, symmetric strength 5/5 in the upper and lower extremities bilaterally  Psychiatric: Normal Affect     -------------------------------------------------- RESULTS -------------------------------------------------  I have personally reviewed all laboratory and imaging results for this patient. Results are listed below. LABS:        Results for orders placed or performed during the hospital encounter of 10/16/22   EKG 12 Lead   Result Value Ref Range     Ventricular Rate 78 BPM     Atrial Rate 78 BPM     P-R Interval 134 ms     QRS Duration 58 ms     Q-T Interval 380 ms     QTc Calculation (Bazett) 433 ms     P Axis 91 degrees     R Axis 6 degrees     T Axis 57 degrees         RADIOLOGY:  Interpreted by Radiologist.  XR CHEST PORTABLE    (Results Pending)   XR HIP 2-3 VW W PELVIS RIGHT    (Results Pending)         EKG:   This EKG is signed and interpreted by the EP. Time: 2011  Rate: 78  Rhythm: Sinus  Interpretation: no acute changes NSR   Comparison: Changes from old EKG         ------------------------- NURSING NOTES AND VITALS REVIEWED ---------------------------              The nursing notes within the ED encounter and vital signs as below have been reviewed by myself. BP (!) 188/86   Pulse 75   Temp 97.5 °F (36.4 °C) (Oral)   Resp 18   Ht 5' 5\" (1.651 m)   Wt 108 lb (49 kg)   SpO2 96%   BMI 17.97 kg/m²   Oxygen Saturation Interpretation: Normal     The patients available past medical records and past encounters were reviewed. ------------------------------ ED COURSE/MEDICAL DECISION MAKING----------------------  Medications   fentaNYL (SUBLIMAZE) injection 50 mcg (has no administration in time range)            XR - right superior and inferior pubic rami fx    Assessment - 81 yo female with right superior and inferior pubic rami fx    Plan  Pain control  Admitted to medicine  Ortho consult  PT  No plan for surgery  PWB 50% right leg with walker. WBAT left leg. CT pelvis to eval fx  Follow up with Mariangel Ding in 2 weeks      Of note, greater than 50 minutes were spent on the floor and with the patient performing a history and physical, reviewing labs and imaging, and discussing plan. Half the time was spent counseling and coordinating care.

## 2022-10-17 NOTE — ED PROVIDER NOTES
HPI:  10/16/22,   Time: 8:00 PM EDT       Ozzy Odonnell is a 80 y.o. female presenting to the ED for fall right hip pain, beginning 2 hours ago. The complaint has been persistent, moderate in severity, and worsened by standing. Patient stated she had a mechanical fall tonight she got up tripped over her walker landing on her right hip. She reports previous ORIF of the hip. Denies hitting her head. She has had no chest pain or shortness of breath heart chest wall injury. She has no back pain or other neurological complaints. She is unable to stand or bear weight. She called her son who found her on the floor this morning around 630. Has no fevers or chills. She denies shortness of breath abdominal pain nausea vomiting. Review of Systems:   Pertinent positives and negatives are stated within HPI, all other systems reviewed and are negative.    --------------------------------------------- PAST HISTORY ---------------------------------------------  Past Medical History:  has a past medical history of Hypertension, Osteoporosis, and TIA (transient ischemic attack). Past Surgical History:  has a past surgical history that includes Appendectomy and HEMIARTHROPLASTY HIP (Left, 2/26/2019). Social History:  reports that she has never smoked. She has never used smokeless tobacco. She reports that she does not drink alcohol and does not use drugs. Family History: family history is not on file. The patients home medications have been reviewed. Allergies: Patient has no known allergies. ---------------------------------------------------PHYSICAL EXAM--------------------------------------    Constitutional/General: Alert and oriented x3, patient appears in mild distress. She is thin for age.   Head: Normocephalic and atraumatic  Eyes: PERRL, EOMI, conjunctive normal, sclera non icteric  Mouth: Oropharynx clear, handling secretions, no trismus, no asymmetry of the posterior oropharynx or uvular edema  Neck: Supple, full ROM, non tender to palpation in the midline, no stridor, no crepitus, no meningeal signs  Respiratory: Lungs clear to auscultation bilaterally, no wheezes, rales, or rhonchi. Not in respiratory distress  Cardiovascular:  Regular rate. Regular rhythm. No murmurs, gallops, or rubs. 2+ distal pulses  Chest: No chest wall tenderness no chest wall bony crepitus or tenderness. GI:  Abdomen Soft, Non tender, Non distended. +BS. No organomegaly, no palpable masses,  No rebound, guarding, or rigidity. Musculoskeletal: Moves all extremities x 4. Warm and well perfused, no clubbing, cyanosis, or edema. Capillary refill <3 seconds, patient has tenderness to palpation to the right lateral hip. She is guarding the right buttocks with her right hand. Integument: skin warm and dry. No rashes. Lymphatic: no lymphadenopathy noted  Neurologic: GCS 15, no focal deficits, symmetric strength 5/5 in the upper and lower extremities bilaterally  Psychiatric: Normal Affect    -------------------------------------------------- RESULTS -------------------------------------------------  I have personally reviewed all laboratory and imaging results for this patient. Results are listed below.      LABS:  Results for orders placed or performed during the hospital encounter of 10/16/22   COVID-19, Rapid    Specimen: Nasopharyngeal Swab; Nares   Result Value Ref Range    SARS-CoV-2, NAAT Not Detected Not Detected   Troponin   Result Value Ref Range    Troponin, High Sensitivity 14 (H) 0 - 9 ng/L   CBC   Result Value Ref Range    WBC 9.7 4.5 - 11.5 E9/L    RBC 4.18 3.50 - 5.50 E12/L    Hemoglobin 12.6 11.5 - 15.5 g/dL    Hematocrit 38.2 34.0 - 48.0 %    MCV 91.4 80.0 - 99.9 fL    MCH 30.1 26.0 - 35.0 pg    MCHC 33.0 32.0 - 34.5 %    RDW 12.8 11.5 - 15.0 fL    Platelets 238 392 - 243 E9/L    MPV 9.7 7.0 - 12.0 fL   Basic Metabolic Panel   Result Value Ref Range    Sodium 134 132 - 146 mmol/L    Potassium 4.4 3.5 - 5.0 mmol/L    Chloride 97 (L) 98 - 107 mmol/L    CO2 23 22 - 29 mmol/L    Anion Gap 14 7 - 16 mmol/L    Glucose 119 (H) 74 - 99 mg/dL    BUN 19 6 - 23 mg/dL    Creatinine 0.8 0.5 - 1.0 mg/dL    GFR Non-African American >60 >=60 mL/min/1.73    GFR African American >60     Calcium 10.4 (H) 8.6 - 10.2 mg/dL   Basic Metabolic Panel w/ Reflex to MG   Result Value Ref Range    Sodium 134 132 - 146 mmol/L    Potassium reflex Magnesium 3.9 3.5 - 5.0 mmol/L    Chloride 102 98 - 107 mmol/L    CO2 23 22 - 29 mmol/L    Anion Gap 9 7 - 16 mmol/L    Glucose 145 (H) 74 - 99 mg/dL    BUN 16 6 - 23 mg/dL    Creatinine 0.6 0.5 - 1.0 mg/dL    Est, Glom Filt Rate >60 >=60 mL/min/1.73    Calcium 9.5 8.6 - 10.2 mg/dL   CBC with Auto Differential   Result Value Ref Range    WBC 9.1 4.5 - 11.5 E9/L    RBC 3.15 (L) 3.50 - 5.50 E12/L    Hemoglobin 9.4 (L) 11.5 - 15.5 g/dL    Hematocrit 28.7 (L) 34.0 - 48.0 %    MCV 91.1 80.0 - 99.9 fL    MCH 29.8 26.0 - 35.0 pg    MCHC 32.8 32.0 - 34.5 %    RDW 13.1 11.5 - 15.0 fL    Platelets 606 335 - 682 E9/L    MPV 9.9 7.0 - 12.0 fL    Neutrophils % 82.6 (H) 43.0 - 80.0 %    Immature Granulocytes % 0.8 0.0 - 5.0 %    Lymphocytes % 5.1 (L) 20.0 - 42.0 %    Monocytes % 10.8 2.0 - 12.0 %    Eosinophils % 0.6 0.0 - 6.0 %    Basophils % 0.1 0.0 - 2.0 %    Neutrophils Absolute 7.52 (H) 1.80 - 7.30 E9/L    Immature Granulocytes # 0.07 E9/L    Lymphocytes Absolute 0.46 (L) 1.50 - 4.00 E9/L    Monocytes Absolute 0.98 (H) 0.10 - 0.95 E9/L    Eosinophils Absolute 0.05 0.05 - 0.50 E9/L    Basophils Absolute 0.01 0.00 - 0.20 E9/L    RBC Morphology Normal    EKG 12 Lead   Result Value Ref Range    Ventricular Rate 78 BPM    Atrial Rate 78 BPM    P-R Interval 134 ms    QRS Duration 58 ms    Q-T Interval 380 ms    QTc Calculation (Bazett) 433 ms    P Axis 91 degrees    R Axis 6 degrees    T Axis 57 degrees       RADIOLOGY:  Interpreted by Radiologist.  CT PELVIS WO CONTRAST Additional Contrast? None   Final Result Acute fractures of the right pubic bone. Acute fracture of right sacral wing. Stable appearance for the right and left hip prosthesis. Generalized osteopenia visualized bones structures. Degenerative changes of the facet joints of L4-5 level with grade 1   anterolisthesis and with severe degree of spinal canal stenosis present at   this level. XR PELVIS (MIN 3 VIEWS)   Final Result   Acute fracture of the right pubic body and inferior pubic ramus. XR CHEST PORTABLE   Final Result   No acute process. XR HIP 2-3 VW W PELVIS RIGHT   Final Result   Addendum (preliminary) 1 of 1   ADDENDUM:   Upon further review and discussing the case with the ordering provider,    there   is suspicion for right pubic fractures near the symphysis. However,   evaluation is limited by overlying catheter and stool. Possible right pubic bone fracture. Repeat pelvic x-rays with removal of    the   overlying catheter have been ordered. Findings were discussed with Ady Gonzalez at 7:28 pm on 10/16/2022. Final   No acute osseous abnormality. EKG:  This EKG is signed and interpreted by the EP. Time: 2011  Rate: 78  Rhythm: Sinus  Interpretation: no acute changes NSR   Comparison: Changes from old EKG       ------------------------- NURSING NOTES AND VITALS REVIEWED ---------------------------   The nursing notes within the ED encounter and vital signs as below have been reviewed by myself. /69   Pulse 92   Temp (!) 96.2 °F (35.7 °C) (Oral)   Resp 16   Ht 5' 5\" (1.651 m)   Wt 108 lb (49 kg)   SpO2 94%   BMI 17.97 kg/m²   Oxygen Saturation Interpretation: Normal    The patients available past medical records and past encounters were reviewed.         ------------------------------ ED COURSE/MEDICAL DECISION MAKING----------------------  Medications   sodium chloride flush 0.9 % injection 5-40 mL (5 mLs IntraVENous Not Given 10/18/22 0736)   sodium chloride flush 0.9 % injection 10 mL (has no administration in time range)   0.9 % sodium chloride infusion (has no administration in time range)   ondansetron (ZOFRAN-ODT) disintegrating tablet 4 mg ( Oral See Alternative 10/17/22 0158)     Or   ondansetron (ZOFRAN) injection 4 mg (4 mg IntraVENous Given 10/17/22 0158)   polyethylene glycol (GLYCOLAX) packet 17 g (has no administration in time range)   acetaminophen (TYLENOL) tablet 650 mg (has no administration in time range)     Or   acetaminophen (TYLENOL) suppository 650 mg (has no administration in time range)   docusate sodium (COLACE) capsule 100 mg (100 mg Oral Given 10/18/22 0844)   labetalol (NORMODYNE) tablet 100 mg (100 mg Oral Given 10/18/22 0844)   melatonin tablet 4.5 mg (4.5 mg Oral Given 10/17/22 2135)   metoprolol tartrate (LOPRESSOR) tablet 25 mg (25 mg Oral Given 10/18/22 0843)   pantoprazole (PROTONIX) tablet 20 mg (20 mg Oral Given 10/18/22 0720)   morphine (PF) injection 2 mg (has no administration in time range)   apixaban (ELIQUIS) tablet 2.5 mg (2.5 mg Oral Given 10/18/22 0844)   fentaNYL (SUBLIMAZE) injection 50 mcg (50 mcg IntraVENous Given 10/16/22 2028)   ondansetron (ZOFRAN) injection 4 mg (4 mg IntraVENous Given 10/16/22 2228)   morphine sulfate (PF) injection 4 mg (4 mg IntraVENous Given 10/16/22 2228)         ED COURSE:       Medical Decision Making:    Patient mechanical fall at home. Complaint of right hip pain. Unable to bear weight. She has had previous ORIF of the right hip. Patient is made n.p.o. she was sent for x-rays of chest and hip. She is given IV fentanyl for pain control. She is made n.p.o. anticipate admission for orthopedic consultation. I reviewed x-rays of the pelvis with radiology. There is concern for right-sided pelvic ring fractures.       This patient's ED course included: a personal history and physicial examination, re-evaluation prior to disposition, multiple bedside re-evaluations, IV medications, continuous pulse oximetry, and complex medical decision making and emergency management    This patient has remained hemodynamically stable, improved, and been closely monitored during their ED course. Re-Evaluations:             Re-evaluation. Patients symptoms are improving after IV Fluids. Family updated. Re-examination  10/16/22   8:00 PM EDT    Consultations:             [facility] hospitalist for admission    Critical Care: NONE    Counseling: The emergency provider has spoken with the patient and discussed todays results, in addition to providing specific details for the plan of care and counseling regarding the diagnosis and prognosis. Questions are answered at this time and they are agreeable with the plan.       --------------------------------- IMPRESSION AND DISPOSITION ---------------------------------    IMPRESSION  1. Pelvic ring fracture, closed, [facility]        DISPOSITION  Disposition: Admit to med/surg floor  Patient condition is serious    NOTE: This report was transcribed using voice recognition software.  Every effort was made to ensure accuracy; however, inadvertent computerized transcription errors may be present       Theron Galvan DO  10/18/22 9273

## 2022-10-17 NOTE — CARE COORDINATION
Social Work:    Reviewed chart notes. Mrs. Rosas fell and suffered a non-surgical pelvic fracture. Social work met with Mrs. Rosas and spoke with son Cleda Opitz via phone. Mrs. Orestes Agudelo resides with her  in a ranch home and used a wheeled walker prior to admission. Cleda Opitz advised that the plan is short rehab a [facility] prior to home. Social work called a referral in to Stevan Slater at [facility] for discharge today.     Electronically signed by CELSA Hood on 10/17/2022 at 1:41 PM

## 2022-10-17 NOTE — PROGRESS NOTES
Comprehensive Nutrition Assessment    Type and Reason for Visit:  Initial, Positive Nutrition Screen (wt loss)    Nutrition Recommendations/Plan:     Begin Ensure High [place]/Protein ONS while inpatient. Continue current diet and monitor       Malnutrition Assessment:  Malnutrition Status: At risk for malnutrition (Comment) (10/17/22 7043)    Context:  Acute Illness     Findings of the 6 clinical characteristics of malnutrition:  Energy Intake:  Mild decrease in energy intake (Comment) (Reports decreased PO PTA; \"nibbles all day,\" drinks Boost once/d)  Weight Loss:  Unable to assess (AGUSTO; lack measured wt hx. Pt w/ subjective weight loss of a few pounds)     Body Fat Loss:  Unable to assess (d/t advanced age)     Muscle Mass Loss:  Unable to assess (d/t advanced age)    Fluid Accumulation:  Unable to assess     Strength:  Not Performed    Nutrition Assessment:    Pt admitted s/p fall with pelvic ring fracture. PMHx HTN, osteoporosis. Will add High [place]/Pro Ensure BID to optimize nutrient intake & continue to monitor. Nutrition Related Findings:    A/O x 4; Abd WNL, no edema. Wound Type: None       Current Nutrition Intake & Therapies:    Average Meal Intake: Unable to assess  Average Supplements Intake: Unable to assess  ADULT DIET; Regular    Anthropometric Measures:  Height: 5' 5\" (165.1 cm)  Ideal Body Weight (IBW): 125 lbs (57 kg)    Admission Body Weight: 108 lb 0.4 oz (49 kg) (10/17; stated)  Current Body Weight: 108 lb 0.4 oz (49 kg) (10/17 stated. UTO updated CBW), 86.4 % IBW. Current BMI (kg/m2): 18  Usual Body Weight:  (UTO d/t lack measured wt hx trend.  UBW per pt 110#.)     Weight Adjustment For: No Adjustment                 BMI Categories: Underweight (BMI less than 22) age over 72    Estimated Daily Nutrient Needs:  Energy Requirements Based On: Formula  Weight Used for Energy Requirements: Current  Energy (kcal/day): 3898-7727 kcal/d  Weight Used for Protein Requirements: Current  Protein (g/day): 50-60 gm pro/d (1.0-1.2 gm pro/kg CBW)  Method Used for Fluid Requirements: 1 ml/kcal  Fluid (ml/day): 1539-6798 mL/d    Nutrition Diagnosis:   No nutrition diagnosis at this time     Nutrition Interventions:   Food and/or Nutrient Delivery: Continue Current Diet, Start Oral Nutrition Supplement (Ensure BID)  Nutrition Education/Counseling: No recommendation at this time  Coordination of Nutrition Care: Continue to monitor while inpatient       Goals:     Goals: PO intake 75% or greater, by next RD assessment       Nutrition Monitoring and Evaluation:   Behavioral-Environmental Outcomes: None Identified  Food/Nutrient Intake Outcomes: Food and Nutrient Intake, Supplement Intake  Physical Signs/Symptoms Outcomes: Biochemical Data, Chewing or Swallowing, GI Status, Fluid Status or Edema, Nutrition Focused Physical Findings, Skin, Weight    Discharge Planning:     Too soon to determine     SUZE Waters, RD  Contact: [phone]

## 2022-10-17 NOTE — PROGRESS NOTES
Occupational Therapy    OCCUPATIONAL THERAPY INITIAL EVALUATION     Nanda Vasquez [place], [place]         QSW:                                                  Patient Name: Noah Sanderson    MRN: [id number]    : 1934    Room: [address] [place] [address]      Evaluating OT: Lloyd Solorio OTR/L   [id number]      Referring KOFFI Stevens CNP    Specific Provider Orders/Date:OT eval and treat 10/17/2022      Diagnosis:  Pelvic ring fracture, closed, initial encounter ([facility] [address] [K35.273Y]    S/p fall.   right superior and inferior pubic rami fx. No plan for surgery     Pertinent Medical History: osteoporosis, TIA, L [place] 2019     Precautions:  Fall Risk, PWB 50% R LE,  WBAT L LE     Assessment of current deficits    [x] Functional mobility  [x]ADLs  [x] Strength               []Cognition    [x] Functional transfers   [x] IADLs         [x] Safety Awareness   [x]Endurance    [] Fine Coordination              [x] Balance      [] Vision/perception   []Sensation     []Gross Motor Coordination  [] ROM  [] Delirium                   [] Motor Control     OT PLAN OF CARE   OT POC based on physician orders, patient diagnosis and results of clinical assessment    Frequency/Duration  2-4 days/wk for 2 weeks PRN   Specific OT Treatment Interventions to include:   ADL retraining/adapted techniques and AE recommendations to increase functional independence within precautions                    Energy conservation techniques to improve tolerance for selfcare routine   Functional transfer/mobility training/DME recommendations for increased independence, safety and fall prevention         Patient/family education to increase safety and functional independence             Environmental modifications for safe mobility and completion of ADLs                             Therapeutic activity to improve functional performance during ADLs. Therapeutic exercise to improve tolerance and functional strength for ADLs    Balance retraining/tolerance tasks for facilitation of postural control with dynamic challenges during ADLs . Positioning to improve functional independence      Recommended Adaptive Equipment: TBD     Home Living: Pt lives with . 1 story. 4 steps/rail   Bathroom setup: tub/shower, tub. Patient reports she sponge bathes    Equipment owned: walker, cane    Prior Level of Function: assist as needed  with ADLs , assist  with IADLs; ambulated with walke    Pain Level: groin pain ;   Cognition: A&O: pleasant, following commands, conversing    Memory:  forgetful    Sequencing:  fair +   Problem solving:  fair   Judgement/safety:  fair     Functional Assessment:  AM-PAC Daily Activity Raw Score: 15/24   Initial Eval Status  Date: 10/17/22 Treatment Status  Date: STGs = LT  Time frame: 10-14 days   Feeding Independent      Grooming SBA/set-up.  Seated   Decrease standing tolerance, balance  Supervision    UB Dressing Min A   Supervision    LB Dressing Max A   Min a    Bathing Mod A   Min A    Toileting Mod A   Min A    Bed Mobility  Upon entry sitting in chair    Mod A   Sit- supine   SBA    Functional Transfers Min A  Sit stand from bed   SBA/supervision    Functional Mobility Min A, w/walker   Steps next to bed only - patient c/o increase pain , wanting to lay down   SBA with good tolerance    Balance Sitting:     Static:  Independent     Dynamic:Mod A   Standing: Min A  Independent/SBA    Activity Tolerance No SOB   Pain limiting   Good  with ADL activity    Visual/  Perceptual Glasses: yes                 Hand Dominance right   AROM (PROM) Strength Additional Info:    RUE  WFL WFL good  and wfl FMC/dexterity noted during ADL tasks       LUE WF WFL good  and wfl FMC/dexterity noted during ADL tasks       Hearing: [facility]  Sensation:  No c/o numbness or tingling   Tone: WFL   Edema: none observed     Comments: Upon arrival patient sitting in chair . At end of session, patient lyng in bed  with call light and phone within reach, all lines and tubes intact. *ALARM ON    Overall patient demonstrated  decreased independence and safety during completion of ADL/functional transfer/mobility tasks. Pt would benefit from continued skilled OT to increase safety and independence with completion of ADL/IADL tasks for functional independence and quality of life. Rehab Potential: good for established goals     Patient / Family Goal: patient states he is going to a rehab       Patient and/or family were instructed on functional diagnosis, prognosis/goals and OT plan of care. Demonstrated fair + understanding. Eval Complexity: Low    Time In: 1205  Time Out: 1225      Min Units   OT Eval Low 97165 x  1   OT Eval Medium 47741      OT Eval High 51137      OT Re-Eval [id number]       Therapeutic Ex 87940      Therapeutic Activities 71063       ADL/Self Care 62661       Orthotic Management 60985       Manual 30713     Neuro Re-Ed 99521       Non-Billable Time         Evaluation Time additionally includes thorough review of current medical information, gathering information on past medical history/social history and prior level of function, interpretation of standardized testing/informal observation of tasks, assessment of data and development of plan of care and goals.             Maribel Bryant  OTR/L  OT [id number]

## 2022-10-17 NOTE — DISCHARGE INSTR - COC
Continuity of Care Form    Patient Name: Ken Flores   :  1934  MRN:  [id number]    Admit date:  10/16/2022  Discharge date:  ***    Code Status Order: Full Code   Advance Directives:     Admitting Physician:  No admitting provider for patient encounter. PCP: Leah Beckman MD    Discharging Nurse: [facility] Unit/Room#: 7071/6450-P  Discharging Unit Phone Number: ***    Emergency Contact:   Extended Emergency Contact Information  Primary Emergency Contact: Roderick Rosas  Address: [address] [facility], [address] Phone: [phone]  Mobile Phone: [phone]  Relation: Spouse  Secondary Emergency Contact: Janes Rosas  Address: [address] [facility], [address] Phone: [phone]  Mobile Phone: [phone]  Relation: Child    Past Surgical History:  Past Surgical History:   Procedure Laterality Date    APPENDECTOMY      HEMIARTHROPLASTY HIP Left 2019    LEFT  HIP HEMIARTHROPLASTY ([facility]) performed by Seth Harrison MD at [address] [place] [address] History: There is no immunization history on file for this patient.     Active Problems:  Patient Active Problem List   Diagnosis Code    Closed subcapital fracture of left femur ([facility] [address] S72.012A    HTN (hypertension), benign I10    Severe protein-calorie malnutrition (HCC) E43    Paroxysmal atrial fibrillation (HCC) I48.0    Trauma T14.90XA    T12 compression fracture (HCC) S22.080A    Closed fracture of twelfth thoracic vertebra ([address] S22.089A    Pelvic ring fracture, closed, initial encounter ([facility] [address] S32.810A       Isolation/Infection:   Isolation            No Isolation          Patient Infection Status       None to display            Nurse Assessment:  Last Vital Signs: /88   Pulse 86   Temp 97.9 °F (36.6 °C) (Oral)   Resp 16   Ht 5' 5\" (1.651 m)   Wt 108 lb (49 kg)   SpO2 95%   BMI 17.97 kg/m²     Last documented pain score (0-10 scale): Pain Level: 10  Last Weight:   Wt Readings from Last 1 Encounters:   10/16/22 108 lb (49 kg)     Mental Status:  oriented and alert    IV Access:  - None    Nursing Mobility/ADLs:  Walking   Assisted  Transfer  Assisted  Bathing  Assisted  Dressing  Assisted  Toileting  Assisted  Feeding  Assisted  Med Admin  Assisted  Med Delivery   whole    Wound Care Documentation and Therapy:  Incision 02/26/19 Hip Left (Active)   Number of days: 0194        Elimination:  Continence: Bowel: Yes  Bladder: No  Urinary Catheter: None   Colostomy/Ileostomy/Ileal Conduit: No       Date of Last BM: 10/17    No intake or output data in the 24 hours ending 10/17/22 1341  No intake/output data recorded. Safety Concerns:     Sundowners Sundrome    Impairments/Disabilities:      None    Nutrition Therapy:  Current Nutrition Therapy:   - Oral Diet:  General    Routes of Feeding: Oral  Liquids: Thin Liquids  Daily Fluid Restriction: no  Last Modified Barium Swallow with Video (Video Swallowing Test): not done    Treatments at the Time of Hospital Discharge:   Respiratory Treatments: n/a    Oxygen Therapy:  is not on home oxygen therapy.   Ventilator:    - No ventilator support    Rehab Therapies: Physical Therapy and Occupational Therapy  Weight Bearing Status/Restrictions: Partial weight bearing (30-50%) only on leg right leg  Other Medical Equipment (for information only, NOT a DME order):  walker  Other Treatments: n/a      Patient's personal belongings (please select all that are sent with patient):  None    RN SIGNATURE:  Electronically signed by Troy Groves RN on 10/18/22 at 9:37 AM EDT    CASE MANAGEMENT/SOCIAL WORK SECTION    Inpatient Status Date: ***    Readmission Risk Assessment Score:  Readmission Risk              Risk of Unplanned Readmission:  12           Discharging to Facility/ Agency   Name: [facility]  Address:  Phone: [phone]  Fax: [phone]    Dialysis Facility (if applicable)   Name:  Address:  Dialysis Schedule:  Phone:  Fax:    / signature: Electronically signed by CELSA Rodriguez on 10/17/2022 at 1:42 PM    PHYSICIAN SECTION    Prognosis: {Prognosis:8780335239}    Condition at Discharge: Howard Bermudez Patient Condition:[id number]}    Rehab Potential (if transferring to Rehab): {Prognosis:3657739573}    Recommended Labs or Other Treatments After Discharge: ***    Physician Certification: I certify the above information and transfer of Grisel Estrada  is necessary for the continuing treatment of the diagnosis listed and that she requires [place] for less 30 days.      Update Admission H&P: {CHP DME Changes in CIK:[id number]}    PHYSICIAN SIGNATURE:  Electronically signed by Usama Hawthorne DO on 10/18/22 at 9:39 AM EDT

## 2022-10-17 NOTE — PROGRESS NOTES
Physical Therapy  Facility/Department: [facility] SURGERY  Physical Therapy Initial Assessment    Name: Alem Ansari  : 1934  MRN: [id number]  Date of Service: 10/17/2022             Patient Diagnosis(es): The encounter diagnosis was Pelvic ring fracture, closed, initial encounter ([facility] [address] Past Medical History:  has a past medical history of Hypertension, Osteoporosis, and TIA (transient ischemic attack). Past Surgical History:  has a past surgical history that includes Appendectomy and HEMIARTHROPLASTY HIP (Left, 2019). Requires PT Follow-Up: Yes     Evaluating Therapist: Sandra Charles PT     Referring Provider:  KOFFI Grayson CNP    PT order : PT eval and treat     Room #: 717  DIAGNOSIS:  R sup/inf pubic rami fx   PRECAUTIONS: falls, 50% PWB R LE     Social:  Pt lives with  spouse  in a  1  floor plan  4  steps and  rails to enter. Prior to admission pt walked with ww. Info per pt . Questionable historian . Pt reports her son lives next door      Initial Evaluation  Date: 10/17/2022  Treatment      Short Term/ Long Term   Goals   Was pt agreeable to Eval/treatment? Yes      Does pt have pain?  R hip       Bed Mobility  Rolling: NT   Supine to sit:  SBA/CGA   Sit to supine: NT   Scooting: min assist in sit   S/SBA    Transfers Sit to stand: min assist   Stand to sit: CGA   Stand pivot:  NT    S/SBA    Ambulation    6  feet with  ww  with min assist    50  feet with  ww  with  SBA/CGA R LE 50% PWB        Stair negotiation: ascended and descended NT   4  steps with  1 -2  rail with CGA/ min assist    LE ROM  Decreased throughout B hips, distally WFL      LE strength  2-/ 5  R hip, 3-/ 5 L hip, 3+ to 4- / 5 distally   Increase by 1-2/ 3 MMT grade    AM- PAC RAW score   16/ 24            Pt is alert and Oriented x  2     Balance: CGA/min assist . Fall risk due to  decreased balance, pain, weakness, and decreased  safety awareness     Endurance: limited by pain   Bed/Chair alarm: yes      ASSESSMENT  Pt displays functional ability as noted in the objective portion of this evaluation. Conditions Requiring Skilled Therapeutic Intervention:    [x]Decreased strength     [x]Decreased ROM  [x]Decreased functional mobility  [x]Decreased balance   [x]Decreased endurance   [x]Decreased posture  []Decreased sensation  []Decreased coordination   []Decreased vision  [x]Decreased safety awareness   [x]Increased pain         Treatment/Education:    Pt in bed  upon arrival ; agreeable to PT. Mobility as above. Pt instructed in R LE PWB. Pt required assist with donning brief prior to mobility. Instruction needed for hand placement with transfers and for [facility] and sequencing with gait. Pt unable to maintain PWB. Questionable comprehension. Gait limited by pain . Pt educated on fall risk, safety with mobility        Patient response to education:   Pt verbalized understanding Pt demonstrated skill Pt requires further education in this area   x  Needs assist   x       Comments:  Pt left in chair  after session, with call light in reach. Waffle cushion placed       Rehab potential is Good for reaching above PT goals. Pts/ family goals   1. Home     Patient and or family understand(s) diagnosis, prognosis, and plan of care. -  quesitonable     PLAN  PT care will be provided in accordance with the objectives noted above. Whenever appropriate, clear delegation orders will be provided for nursing staff. Exercises and functional mobility practice will be used as well as appropriate assistive devices or modalities to obtain goals. Patient and family education will also be administered as needed.         PLAN OF CARE:    Current Treatment Recommendations     [x] Strengthening to improve independence with functional mobility   [x] ROM to improve independence with functional mobility   [x] Balance Training to improve static/dynamic balance and to reduce fall risk  [x] Endurance Training to improve activity tolerance during functional mobility   [x] Transfer Training to improve safety and independence with all functional transfers   [x] Gait Training to improve gait mechanics, endurance and assess need for appropriate assistive device  [x] Stair Training in preparation for safe discharge home and/or into the community   [x] Positioning to prevent skin breakdown and contractures  [x] Safety and Education Training   [x] Patient/Caregiver Education   [] HEP  [] Other     Frequency of treatments will be daily x 3-5 days. Time in: 1107   Time out:  1130       Evaluation Time includes thorough review of current medical information, gathering information on past medical history/social history and prior level of function, completion of standardized testing/informal observation of tasks, assessment of data and education on plan of care and goals.     CPT codes:  [x] Low Complexity PT evaluation 30974  [] Moderate Complexity PT evaluation 87371  [] High Complexity PT evaluation 58343  [] PT Re-evaluation 42062  [] Gait training 47482  minutes  [x] Therapeutic activities 36501 8 minutes  [] Therapeutic exercises 41935  minutes  [] Neuromuscular reeducation 04662  minutes       Sari Castro number:  PT 3048

## 2022-10-18 VITALS
HEIGHT: 65 IN | WEIGHT: 108 LBS | SYSTOLIC BLOOD PRESSURE: 122 MMHG | TEMPERATURE: 96.2 F | OXYGEN SATURATION: 94 % | DIASTOLIC BLOOD PRESSURE: 69 MMHG | HEART RATE: 92 BPM | RESPIRATION RATE: 16 BRPM | BODY MASS INDEX: 17.99 KG/M2

## 2022-10-18 LAB
ANION GAP SERPL CALCULATED.3IONS-SCNC: 9 MMOL/L (ref 7–16)
BASOPHILS ABSOLUTE: 0.01 E9/L (ref 0–0.2)
BASOPHILS RELATIVE PERCENT: 0.1 % (ref 0–2)
BUN BLDV-MCNC: 16 MG/DL (ref 6–23)
CALCIUM SERPL-MCNC: 9.5 MG/DL (ref 8.6–10.2)
CHLORIDE BLD-SCNC: 102 MMOL/L (ref 98–107)
CO2: 23 MMOL/L (ref 22–29)
CREAT SERPL-MCNC: 0.6 MG/DL (ref 0.5–1)
EOSINOPHILS ABSOLUTE: 0.05 E9/L (ref 0.05–0.5)
EOSINOPHILS RELATIVE PERCENT: 0.6 % (ref 0–6)
GFR SERPL CREATININE-BSD FRML MDRD: >60 ML/MIN/1.73
GLUCOSE BLD-MCNC: 145 MG/DL (ref 74–99)
HCT VFR BLD CALC: 28.7 % (ref 34–48)
HEMOGLOBIN: 9.4 G/DL (ref 11.5–15.5)
IMMATURE GRANULOCYTES #: 0.07 E9/L
IMMATURE GRANULOCYTES %: 0.8 % (ref 0–5)
LYMPHOCYTES ABSOLUTE: 0.46 E9/L (ref 1.5–4)
LYMPHOCYTES RELATIVE PERCENT: 5.1 % (ref 20–42)
MCH RBC QN AUTO: 29.8 PG (ref 26–35)
MCHC RBC AUTO-ENTMCNC: 32.8 % (ref 32–34.5)
MCV RBC AUTO: 91.1 FL (ref 80–99.9)
MONOCYTES ABSOLUTE: 0.98 E9/L (ref 0.1–0.95)
MONOCYTES RELATIVE PERCENT: 10.8 % (ref 2–12)
NEUTROPHILS ABSOLUTE: 7.52 E9/L (ref 1.8–7.3)
NEUTROPHILS RELATIVE PERCENT: 82.6 % (ref 43–80)
PDW BLD-RTO: 13.1 FL (ref 11.5–15)
PLATELET # BLD: 158 E9/L (ref 130–450)
PMV BLD AUTO: 9.9 FL (ref 7–12)
POTASSIUM REFLEX MAGNESIUM: 3.9 MMOL/L (ref 3.5–5)
RBC # BLD: 3.15 E12/L (ref 3.5–5.5)
RBC # BLD: NORMAL 10*6/UL
SARS-COV-2, NAAT: NOT DETECTED
SODIUM BLD-SCNC: 134 MMOL/L (ref 132–146)
WBC # BLD: 9.1 E9/L (ref 4.5–11.5)

## 2022-10-18 PROCEDURE — 6370000000 HC RX 637 (ALT 250 FOR IP)

## 2022-10-18 PROCEDURE — 80048 BASIC METABOLIC PNL TOTAL CA: CPT

## 2022-10-18 PROCEDURE — 85025 COMPLETE CBC W/AUTO DIFF WBC: CPT

## 2022-10-18 PROCEDURE — 87635 SARS-COV-2 COVID-19 AMP PRB: CPT

## 2022-10-18 PROCEDURE — 36415 COLL VENOUS BLD VENIPUNCTURE: CPT

## 2022-10-18 RX ADMIN — METOPROLOL TARTRATE 25 MG: 25 TABLET, FILM COATED ORAL at 08:43

## 2022-10-18 RX ADMIN — DOCUSATE SODIUM 100 MG: 100 CAPSULE, LIQUID FILLED ORAL at 08:44

## 2022-10-18 RX ADMIN — APIXABAN 2.5 MG: 2.5 TABLET, FILM COATED ORAL at 08:44

## 2022-10-18 RX ADMIN — PANTOPRAZOLE SODIUM 20 MG: 20 TABLET, DELAYED RELEASE ORAL at 07:20

## 2022-10-18 RX ADMIN — LABETALOL HYDROCHLORIDE 100 MG: 100 TABLET, FILM COATED ORAL at 08:44

## 2022-10-18 NOTE — PROGRESS NOTES
Hospitalist Progress Note      PCP: Marin Bashir MD    Date of Admission: 10/16/2022        Hospital Course:  80 y.o. female presented with  FALL, [address] AND SHE FELL, DOES NOT KNOW HOW,  DENIES STRIKING HEAD, BUT FELL ON HER BUTTOCK AND SUSTAIN AND PUBIC RAMI FRACTURE. Subjective:  NO COMPLAINTS           Medications:  Reviewed    Infusion Medications    sodium chloride       Scheduled Medications    sodium chloride flush  5-40 mL IntraVENous 2 times per day    docusate sodium  100 mg Oral Daily    labetalol  100 mg Oral BID    melatonin  4.5 mg Oral Nightly    metoprolol tartrate  25 mg Oral BID    pantoprazole  20 mg Oral QAM AC    apixaban  2.5 mg Oral BID     PRN Meds: sodium chloride flush, sodium chloride, ondansetron **OR** ondansetron, polyethylene glycol, acetaminophen **OR** acetaminophen, morphine    No intake or output data in the 24 hours ending 10/18/22 1415    Exam:    /69   Pulse 92   Temp (!) 96.2 °F (35.7 °C) (Oral)   Resp 16   Ht 5' 5\" (1.651 m)   Wt 108 lb (49 kg)   SpO2 94%   BMI 17.97 kg/m²       General appearance:  No apparent distress, appears stated age. HEENT:  Normal cephalic, atraumatic without obvious deformity. Pupils equal, round, and reactive to light. Extra ocular muscles intact. Conjunctivae/corneas clear. Neck: Supple, with full range of motion. No jugular venous distention. Trachea midline. Respiratory:  Normal respiratory effort. Clear to auscultation,   Cardiovascular:  IRREG  Abdomen: Soft, non-tender, non-distended with normal bowel sounds. Musculoskeletal:  No clubbing, cyanosis or edema bilaterally.     Skin: smooth and dry   Neurologic:   Cranial nerves: II-XII intact,   Psychiatric:  Alert and oriented x 3           Labs:   Recent Labs     10/16/22  2024 10/18/22  0630   WBC 9.7 9.1   HGB 12.6 9.4*   HCT 38.2 28.7*    158     Recent Labs     10/16/22  2024 10/18/22  0630    134   K 4.4 3.9 CL 97* 102   CO2 23 23   BUN 19 16   CREATININE 0.8 0.6   CALCIUM 10.4* 9.5     No results for input(s): AST, ALT, BILIDIR, BILITOT, ALKPHOS in the last 72 hours. No results for input(s): INR in the last 72 hours. No results for input(s): Normajean Lyons in the last 72 hours. No results for input(s): AST, ALT, ALB, BILIDIR, BILITOT, ALKPHOS in the last 72 hours. No results for input(s): LACTA in the last 72 hours. No results found for: Tildon George  No results found for: AMMONIA    Assessment:    Active Hospital Problems    Diagnosis Date Noted    Pelvic ring fracture, closed, [facility] [S32.810A] 10/16/2022     Priority: Medium   PAF  T12 FRACTURE?  IF OLD OR NEW   HYPERCALCEMIA(RESOLVED)   HTN          Plan:  APPLE TO [facility]  Electronically signed by Tammie Izquierdo DO on 10/18/2022 at 2:15 PM [facility]

## 2022-10-18 NOTE — CARE COORDINATION
Social Work:    Physician's ambulance was arranged to transport Mrs. Rosas to [facility] today at 1:00 p.m. Social service updated Mrs. Rosas, her son Charles Sena, and Fidelia at  [facility].     Electronically signed by CELSA Lopez on 10/18/2022 at 8:41 AM

## 2022-10-21 NOTE — DISCHARGE SUMMARY
Hospitalist Discharge Summary    Patient ID: Karissa Allen   Patient : [id number]  Patient's PCP: Darrel Castaneda MD    Admit Date: 10/16/2022   Admitting Physician: Cristina Whitman DO    Discharge Date:  10/21/2022   Discharge Physician: Cristina Whitman DO   Discharge Condition: Stable  Discharge Disposition: Skilled Facility      Discharge Diagnoses: Active Hospital Problems    Diagnosis Date Noted    Pelvic ring fracture, closed, initial encounter ([facility] [address] Trang Elliott 10/16/2022     Priority: [address] [place] [address] course in brief:   80 y.o. female presented with  FALL, [address], DOES NOT KNOW HOW,  DENIES STRIKING HEAD, BUT FELL ON HER BUTTOCK AND SUSTAIN AND PUBIC RAMI FRACTURE. PHYSICAL EXAM:    /69   Pulse 92   Temp (!) 96.2 °F (35.7 °C) (Oral)   Resp 16   Ht 5' 5\" (1.651 m)   Wt 108 lb (49 kg)   SpO2 94%   BMI 17.97 kg/m²       General appearance:  No apparent distress, appears stated age. HEENT:  Normal cephalic, atraumatic without obvious deformity. Pupils equal, round, and reactive to light. Extra ocular muscles intact. Conjunctivae/corneas clear. Neck: Supple, with full range of motion. No jugular venous distention. Trachea midline. Respiratory:  Normal respiratory effort. Clear to auscultation,   Cardiovascular:  IRREG  Abdomen: Soft, non-tender, non-distended with normal bowel sounds. Musculoskeletal:  No clubbing, cyanosis or edema bilaterally. Skin: smooth and dry   Neurologic:   Cranial nerves: II-XII intact,   Psychiatric:  Alert and oriented x 3     Prior to Admission medications    Medication Sig Start Date End Date Taking?  Authorizing Provider   divalproex (DEPAKOTE SPRINKLE) 125 MG capsule Take 1 capsule by mouth every 12 hours 22   John Campbell MD   ondansetron (ZOFRAN-ODT) 4 MG disintegrating tablet Take 1 tablet by mouth every 8 hours as needed for Nausea or Vomiting 22   Rosamaria Dean Momo Rios MD   ondansetron (ZOFRAN) 4 MG/2ML injection Infuse 2 mLs intravenously every 6 hours as needed for Nausea or Vomiting 6/22/22   Mg Geiger MD   bisacodyl (DULCOLAX) 5 MG EC tablet Take 1 tablet by mouth daily 6/23/22   Mg Geiger MD   melatonin 5 MG TBDP disintegrating tablet Take 1 tablet by mouth nightly 6/22/22   Mg Geiger MD   pantoprazole (PROTONIX) 20 MG tablet Take 1 tablet by mouth every morning (before breakfast) 6/23/22   Mg Geiger MD   labetalol (NORMODYNE) 100 MG tablet Take 100 mg by mouth 2 times daily    Historical Provider, MD   docusate sodium (COLACE, DULCOLAX) 100 MG CAPS Take 100 mg by mouth daily 3/1/19   Heber Gallo MD   apixaban Jose Luis Renee) 2.5 MG TABS tablet Take 1 tablet by mouth 2 times daily 3/5/18   Heber Gallo MD   metoprolol tartrate (LOPRESSOR) 25 MG tablet Take 1 tablet by mouth 2 times daily 3/5/18   Heber Gallo MD   mirabegron (MYRBETRIQ) 25 MG TB24 Take by mouth every 48 hours as needed     Historical Provider, MD       Consults:   IP CONSULT TO HOSPITALIST  IP CONSULT TO ORTHOPEDIC SURGERY  IP CONSULT TO HOME CARE NEEDS            Discharge Instructions / Follow up:    No future appointments. Continued appropriate risk factor modification of blood pressure, diabetes and serum lipids will remain essential to reducing risk of future atherosclerotic development    Activity: activity as tolerated    Significant labs:  CBC:   No results for input(s): WBC, RBC, HGB, HCT, MCV, RDW, PLT in the last 72 hours. BMP: No results for input(s): NA, K, CL, CO2, BUN, CREATININE, CA, MG, PHOS in the last 72 hours. LFT:  No results for input(s): PROT, ALB, ALKPHOS, ALT, AST, BILITOT, AMYLASE, LIPASE in the last 72 hours. PT/INR: No results for input(s): INR, APTT in the last 72 hours. BNP: No results for input(s): BNP in the last 72 hours.   Hgb A1C: No results found for: LABA1C  Folate and B12: No results found for: [id number], No results found for: FOLATE  Thyroid Studies:   Lab Results   Component Value Date    TSH 1.900 03/01/2018       Urinalysis:    Lab Results   Component Value Date/Time    NITRU Negative 06/19/2022 04:07 AM    WBCUA 1-3 06/19/2022 04:07 AM    BACTERIA RARE 06/19/2022 04:07 AM    RBCUA 2-5 06/19/2022 04:07 AM    BLOODU MODERATE 06/19/2022 04:07 AM    SPECGRAV 1.010 06/19/2022 04:07 AM    GLUCOSEU Negative 06/19/2022 04:07 AM       Imaging:  XR LUMBAR SPINE (2-3 VIEWS)    Result Date: 9/27/2022  EXAMINATION: THREE XRAY VIEWS OF THE LUMBAR SPINE 9/27/2022 7:01 am COMPARISON: MRI L-spine 20 June 2022. CT L-spine 19 June 2022. L-spine radiograph 15 May 2018. HISTORY: ORDERING SYSTEM PROVIDED HISTORY: Compression fracture of T12 vertebra with routine healing, subsequent encounter TECHNOLOGIST PROVIDED HISTORY: What reading provider will be dictating this exam?->CRC FINDINGS: T12 compression injury with anterior vertebra plana. Interval progression is evident since 20 June 2022. Degenerative spondylotic changes are redemonstrated associated with grade 1 anterolisthesis of L4 on L5 as before. Normal lumbar soft tissues. Mild lumbar dextroscoliosis. Progression of T12 compression injury with anterior vertebra plana. Degenerative spondylosis with altered alignment at L4-5 as before. CT PELVIS WO CONTRAST Additional Contrast? None    Result Date: 10/17/2022  EXAMINATION: CT OF THE PELVIS WITHOUT CONTRAST 10/17/2022 9:09 am TECHNIQUE: CT of the pelvis was performed without the administration of intravenous contrast.  Multiplanar reformatted images are provided for review. Adjustment of mA and/or kV according to patient size was utilized. Automated exposure control, iterative reconstruction, and/or weight based adjustment of the mA/kV was utilized to reduce the radiation dose to as low as reasonably achievable. COMPARISON: Images of the pelvis from CT abdomen pelvis of June 19, 2022.  HISTORY: ORDERING SYSTEM PROVIDED HISTORY: pelvic pain TECHNOLOGIST PROVIDED HISTORY: Reason for exam:->pelvic pain Additional Contrast?->None FINDINGS: Patient has bilateral hip prosthesis. The acetabular and femoral components in proper position. No periprosthetic fracture seen in the proximal right left femurs. There is an acute displaced fracture of the right pubic bone affecting the body and inferior ramus. There is an acute fracture of the anterolateral subchondral aspect of the right iliac bone extending from superior to inferior. Osteopenia is seen visualized bones structures particular in the iliac wings. Degenerative changes are seen in the facet joints of the lower lumbar spine segments particularly in L4-5 and L5-S1. There is a grade 1 anterolisthesis of L4 in relation to L5 which can be attributed degenerative process of the corresponding facet joints. There is severe degree of spinal canal stenosis at this level. Acute fractures of the right pubic bone. Acute fracture of right sacral wing. Stable appearance for the right and left hip prosthesis. Generalized osteopenia visualized bones structures. Degenerative changes of the facet joints of L4-5 level with grade 1 anterolisthesis and with severe degree of spinal canal stenosis present at this level. XR CHEST PORTABLE    Result Date: 10/16/2022  EXAMINATION: ONE XRAY VIEW OF THE CHEST 10/16/2022 6:20 pm COMPARISON: 06/19/2022 HISTORY: ORDERING SYSTEM PROVIDED HISTORY: Post OP TECHNOLOGIST PROVIDED HISTORY: Reason for exam:->Post OP FINDINGS: The lungs are without acute focal process. There is no effusion or pneumothorax. The cardiomediastinal silhouette is without acute process. The osseous structures are without acute process. No acute process.      XR PELVIS (MIN 3 VIEWS)    Result Date: 10/16/2022  EXAMINATION: ONE XRAY VIEW OF THE PELVIS 10/16/2022 9:34 pm COMPARISON: 06/19/2022 HISTORY: ORDERING SYSTEM PROVIDED HISTORY: hip pain TECHNOLOGIST PROVIDED HISTORY: Remove tubing for x ray Reason for exam:->hip pain FINDINGS: Status post bilateral hip arthroplasties. There is offset of the pubic symphysis, new since the prior examination. There is cortical irregularity of the pubic body and inferior pubic ramus. The sacroiliac joints appear intact. No focal soft tissue abnormality. Acute fracture of the right pubic body and inferior pubic ramus. XR HIP 2-3 VW W PELVIS RIGHT    Addendum Date: 10/16/2022    ADDENDUM: Upon further review and discussing the case with the ordering provider, there is suspicion for right pubic fractures near the symphysis. However, evaluation is limited by overlying catheter and stool. Possible right pubic bone fracture. Repeat pelvic x-rays with removal of the overlying catheter have been ordered. Findings were discussed with William Barroso at 7:28 pm on 10/16/2022. Result Date: 10/16/2022  EXAMINATION: ONE XRAY VIEW OF THE PELVIS AND TWO XRAY VIEWS RIGHT HIP 10/16/2022 6:20 pm COMPARISON: 03/02/2018 HISTORY: ORDERING SYSTEM PROVIDED HISTORY: pain TECHNOLOGIST PROVIDED HISTORY: Reason for exam:->pain FINDINGS: Bilateral hip arthroplasties are noted with adequate alignment. No acute fracture is identified. No focal soft tissue abnormality seen. No acute osseous abnormality.        Discharge Medications:      Medication List        CONTINUE taking these medications      apixaban 2.5 MG Tabs tablet  Commonly known as: ELIQUIS  Take 1 tablet by mouth 2 times daily     bisacodyl 5 MG EC tablet  Commonly known as: DULCOLAX  Take 1 tablet by mouth daily     divalproex 125 MG capsule  Commonly known as: DEPAKOTE SPRINKLE  Take 1 capsule by mouth every 12 hours     docusate 100 MG Caps  Commonly known as: COLACE, DULCOLAX  Take 100 mg by mouth daily     labetalol 100 MG tablet  Commonly known as: NORMODYNE     melatonin 5 MG Tbdp disintegrating tablet  Take 1 tablet by mouth nightly     metoprolol tartrate 25 MG tablet  Commonly known as: LOPRESSOR  Take 1 tablet by mouth 2 times daily     mirabegron 25 MG Tb24  Commonly known as: MYRBETRIQ     ondansetron 4 MG disintegrating tablet  Commonly known as: ZOFRAN-ODT  Take 1 tablet by mouth every 8 hours as needed for Nausea or Vomiting     ondansetron 4 MG/2ML injection  Commonly known as: ZOFRAN  Infuse 2 mLs intravenously every 6 hours as needed for Nausea or Vomiting     pantoprazole 20 MG tablet  Commonly known as: PROTONIX  Take 1 tablet by mouth every morning (before breakfast)            STOP taking these medications      hydrALAZINE 20 MG/ML injection  Commonly known as: APRESOLINE            ASK your doctor about these medications      HYDROcodone-acetaminophen 5-325 MG per tablet  Commonly known as: Norco  Take 1 tablet by mouth every 6 hours as needed for Pain for up to 3 days. Intended supply: 3 days. Take lowest dose possible to manage pain  Ask about: Should I take this medication? Where to Get Your Medications        You can get these medications from any pharmacy    Bring a paper prescription for each of these medications  HYDROcodone-acetaminophen 5-325 MG per tablet         Time Spent on discharge is 30 minutes in the review of medications and discharge plan  and EXAM.    +++++++++++++++++++++++++++++++++++++++++++++++++  Naresh [facility], DO  [address], [place]  +++++++++++++++++++++++++++++++++++++++++++++++++  NOTE: This report was transcribed using voice recognition software. Every effort was made to ensure accuracy; however, inadvertent computerized transcription errors may be present.

## 2023-02-23 ENCOUNTER — APPOINTMENT (OUTPATIENT)
Dept: GENERAL RADIOLOGY | Age: 88
End: 2023-02-23
Payer: MEDICARE

## 2023-02-23 ENCOUNTER — APPOINTMENT (OUTPATIENT)
Dept: ULTRASOUND IMAGING | Age: 88
End: 2023-02-23
Payer: MEDICARE

## 2023-02-23 ENCOUNTER — APPOINTMENT (OUTPATIENT)
Dept: CT IMAGING | Age: 88
End: 2023-02-23
Payer: MEDICARE

## 2023-02-23 ENCOUNTER — HOSPITAL ENCOUNTER (EMERGENCY)
Age: 88
Discharge: HOME | End: 2023-02-23
Attending: STUDENT IN AN ORGANIZED HEALTH CARE EDUCATION/TRAINING PROGRAM
Payer: MEDICARE

## 2023-02-23 VITALS
OXYGEN SATURATION: 98 % | TEMPERATURE: 97.7 F | HEIGHT: 65 IN | DIASTOLIC BLOOD PRESSURE: 76 MMHG | HEART RATE: 92 BPM | WEIGHT: 102 LBS | RESPIRATION RATE: 20 BRPM | SYSTOLIC BLOOD PRESSURE: 146 MMHG | BODY MASS INDEX: 17 KG/M2

## 2023-02-23 DIAGNOSIS — R11.2 NAUSEA AND VOMITING, UNSPECIFIED VOMITING TYPE: Primary | ICD-10-CM

## 2023-02-23 LAB
ALBUMIN SERPL-MCNC: 4.4 G/DL (ref 3.5–5.2)
ALP BLD-CCNC: 83 U/L (ref 35–104)
ALT SERPL-CCNC: 16 U/L (ref 0–32)
ANION GAP SERPL CALCULATED.3IONS-SCNC: 13 MMOL/L (ref 7–16)
AST SERPL-CCNC: 28 U/L (ref 0–31)
BACTERIA: ABNORMAL /HPF
BASOPHILS ABSOLUTE: 0.03 E9/L (ref 0–0.2)
BASOPHILS RELATIVE PERCENT: 0.3 % (ref 0–2)
BILIRUB SERPL-MCNC: 0.7 MG/DL (ref 0–1.2)
BILIRUBIN URINE: NEGATIVE
BLOOD, URINE: ABNORMAL
BUN BLDV-MCNC: 16 MG/DL (ref 6–23)
CALCIUM SERPL-MCNC: 9.9 MG/DL (ref 8.6–10.2)
CHLORIDE BLD-SCNC: 95 MMOL/L (ref 98–107)
CHP ED QC CHECK: NORMAL
CLARITY: ABNORMAL
CO2: 23 MMOL/L (ref 22–29)
COLOR: YELLOW
CREAT SERPL-MCNC: 0.6 MG/DL (ref 0.5–1)
D DIMER: 529 NG/ML DDU
EOSINOPHILS ABSOLUTE: 0 E9/L (ref 0.05–0.5)
EOSINOPHILS RELATIVE PERCENT: 0 % (ref 0–6)
GFR SERPL CREATININE-BSD FRML MDRD: >60 ML/MIN/1.73
GLUCOSE BLD-MCNC: 135 MG/DL
GLUCOSE BLD-MCNC: 145 MG/DL (ref 74–99)
GLUCOSE URINE: NEGATIVE MG/DL
HCT VFR BLD CALC: 37.4 % (ref 34–48)
HEMOGLOBIN: 12.5 G/DL (ref 11.5–15.5)
IMMATURE GRANULOCYTES #: 0.06 E9/L
IMMATURE GRANULOCYTES %: 0.6 % (ref 0–5)
INFLUENZA A BY PCR: NOT DETECTED
INFLUENZA B BY PCR: NOT DETECTED
INR BLD: 1.2
KETONES, URINE: NEGATIVE MG/DL
LACTIC ACID: 2 MMOL/L (ref 0.5–2.2)
LEUKOCYTE ESTERASE, URINE: NEGATIVE
LIPASE: 17 U/L (ref 13–60)
LYMPHOCYTES ABSOLUTE: 0.69 E9/L (ref 1.5–4)
LYMPHOCYTES RELATIVE PERCENT: 6.6 % (ref 20–42)
MCH RBC QN AUTO: 29.1 PG (ref 26–35)
MCHC RBC AUTO-ENTMCNC: 33.4 % (ref 32–34.5)
MCV RBC AUTO: 87.2 FL (ref 80–99.9)
METER GLUCOSE: 135 MG/DL (ref 74–99)
MONOCYTES ABSOLUTE: 0.53 E9/L (ref 0.1–0.95)
MONOCYTES RELATIVE PERCENT: 5.1 % (ref 2–12)
NEUTROPHILS ABSOLUTE: 9.09 E9/L (ref 1.8–7.3)
NEUTROPHILS RELATIVE PERCENT: 87.4 % (ref 43–80)
NITRITE, URINE: NEGATIVE
PDW BLD-RTO: 13.4 FL (ref 11.5–15)
PH UA: 7.5 (ref 5–9)
PLATELET # BLD: 237 E9/L (ref 130–450)
PMV BLD AUTO: 9.8 FL (ref 7–12)
POTASSIUM SERPL-SCNC: 3.9 MMOL/L (ref 3.5–5)
PROTEIN UA: 30 MG/DL
PROTHROMBIN TIME: 13 SEC (ref 9.3–12.4)
RBC # BLD: 4.29 E12/L (ref 3.5–5.5)
RBC UA: ABNORMAL /HPF (ref 0–2)
SARS-COV-2, NAAT: NOT DETECTED
SODIUM BLD-SCNC: 131 MMOL/L (ref 132–146)
SPECIFIC GRAVITY UA: 1.01 (ref 1–1.03)
TOTAL PROTEIN: 7.4 G/DL (ref 6.4–8.3)
TROPONIN, HIGH SENSITIVITY: 13 NG/L (ref 0–9)
TROPONIN, HIGH SENSITIVITY: 20 NG/L (ref 0–9)
UROBILINOGEN, URINE: 0.2 E.U./DL
WBC # BLD: 10.4 E9/L (ref 4.5–11.5)
WBC UA: ABNORMAL /HPF (ref 0–5)

## 2023-02-23 PROCEDURE — 6360000004 HC RX CONTRAST MEDICATION: Performed by: RADIOLOGY

## 2023-02-23 PROCEDURE — 84484 ASSAY OF TROPONIN QUANT: CPT

## 2023-02-23 PROCEDURE — 76705 ECHO EXAM OF ABDOMEN: CPT

## 2023-02-23 PROCEDURE — 71275 CT ANGIOGRAPHY CHEST: CPT

## 2023-02-23 PROCEDURE — 71045 X-RAY EXAM CHEST 1 VIEW: CPT

## 2023-02-23 PROCEDURE — 81001 URINALYSIS AUTO W/SCOPE: CPT

## 2023-02-23 PROCEDURE — 87635 SARS-COV-2 COVID-19 AMP PRB: CPT

## 2023-02-23 PROCEDURE — 2580000003 HC RX 258: Performed by: STUDENT IN AN ORGANIZED HEALTH CARE EDUCATION/TRAINING PROGRAM

## 2023-02-23 PROCEDURE — 83690 ASSAY OF LIPASE: CPT

## 2023-02-23 PROCEDURE — 99285 EMERGENCY DEPT VISIT HI MDM: CPT

## 2023-02-23 PROCEDURE — 96372 THER/PROPH/DIAG INJ SC/IM: CPT

## 2023-02-23 PROCEDURE — 74177 CT ABD & PELVIS W/CONTRAST: CPT

## 2023-02-23 PROCEDURE — 85378 FIBRIN DEGRADE SEMIQUANT: CPT

## 2023-02-23 PROCEDURE — 82962 GLUCOSE BLOOD TEST: CPT

## 2023-02-23 PROCEDURE — 6360000002 HC RX W HCPCS: Performed by: STUDENT IN AN ORGANIZED HEALTH CARE EDUCATION/TRAINING PROGRAM

## 2023-02-23 PROCEDURE — 70450 CT HEAD/BRAIN W/O DYE: CPT

## 2023-02-23 PROCEDURE — 93005 ELECTROCARDIOGRAM TRACING: CPT | Performed by: STUDENT IN AN ORGANIZED HEALTH CARE EDUCATION/TRAINING PROGRAM

## 2023-02-23 PROCEDURE — 85610 PROTHROMBIN TIME: CPT

## 2023-02-23 PROCEDURE — 80053 COMPREHEN METABOLIC PANEL: CPT

## 2023-02-23 PROCEDURE — 85025 COMPLETE CBC W/AUTO DIFF WBC: CPT

## 2023-02-23 PROCEDURE — 83605 ASSAY OF LACTIC ACID: CPT

## 2023-02-23 PROCEDURE — 87502 INFLUENZA DNA AMP PROBE: CPT

## 2023-02-23 PROCEDURE — 36415 COLL VENOUS BLD VENIPUNCTURE: CPT

## 2023-02-23 PROCEDURE — 96374 THER/PROPH/DIAG INJ IV PUSH: CPT

## 2023-02-23 RX ORDER — 0.9 % SODIUM CHLORIDE 0.9 %
1000 INTRAVENOUS SOLUTION INTRAVENOUS ONCE
Status: COMPLETED | OUTPATIENT
Start: 2023-02-23 | End: 2023-02-23

## 2023-02-23 RX ORDER — DIPHENHYDRAMINE HYDROCHLORIDE 50 MG/ML
25 INJECTION INTRAMUSCULAR; INTRAVENOUS ONCE
Status: COMPLETED | OUTPATIENT
Start: 2023-02-23 | End: 2023-02-23

## 2023-02-23 RX ORDER — WARFARIN SODIUM 2 MG/1
2 TABLET ORAL NIGHTLY
COMMUNITY

## 2023-02-23 RX ADMIN — IOPAMIDOL 75 ML: 755 INJECTION, SOLUTION INTRAVENOUS at 14:18

## 2023-02-23 RX ADMIN — DIPHENHYDRAMINE HYDROCHLORIDE 25 MG: 50 INJECTION, SOLUTION INTRAMUSCULAR; INTRAVENOUS at 11:34

## 2023-02-23 RX ADMIN — TRIMETHOBENZAMIDE HYDROCHLORIDE 200 MG: 100 INJECTION INTRAMUSCULAR at 11:22

## 2023-02-23 RX ADMIN — SODIUM CHLORIDE 1000 ML: 9 INJECTION, SOLUTION INTRAVENOUS at 19:29

## 2023-02-23 ASSESSMENT — ENCOUNTER SYMPTOMS
SHORTNESS OF BREATH: 0
SORE THROAT: 0
ABDOMINAL PAIN: 0
TROUBLE SWALLOWING: 0
CONSTIPATION: 0
COUGH: 0
NAUSEA: 1
VOMITING: 1
COLOR CHANGE: 0
DIARRHEA: 0

## 2023-02-23 ASSESSMENT — LIFESTYLE VARIABLES
HOW MANY STANDARD DRINKS CONTAINING ALCOHOL DO YOU HAVE ON A TYPICAL DAY: PATIENT DOES NOT DRINK
HOW OFTEN DO YOU HAVE A DRINK CONTAINING ALCOHOL: NEVER

## 2023-02-23 ASSESSMENT — PAIN - FUNCTIONAL ASSESSMENT
PAIN_FUNCTIONAL_ASSESSMENT: NONE - DENIES PAIN
PAIN_FUNCTIONAL_ASSESSMENT: NONE - DENIES PAIN

## 2023-02-23 ASSESSMENT — PAIN DESCRIPTION - PAIN TYPE: TYPE: ACUTE PAIN

## 2023-02-23 NOTE — ED PROVIDER NOTES
[address] [facility] ENCOUNTER        Pt Name: Catie Chavez  MRN: [id number]  [place] 1934  Date of evaluation: 2/23/2023  Provider: Raul Jara MD  PCP: Anjelica Cui MD  Note Started: 12:02 PM EST 2/23/23    CHIEF COMPLAINT       Chief Complaint   Patient presents with    Emesis     Started this morning. .     Agitation     Per familly thrashing about. Started this morning. HISTORY OF PRESENT ILLNESS: 1 or more Elements     History from : Family      Limitations to history : Behavior    Catie Chavez is a 80 y.o. female who presents from home due to concern of altered behavior with thrashing. Minimal history obtained from patient. Family states that she has been vomiting through the night and agitated this morning. Family states that she has been \"thrashing around\". Also noticed a new onset of right eye drooping this morning. Last known well seems to be about 8PM. Patient states that she notices a tremor in her RUE. Patient is on Eliquis and is compliant. Nursing Notes were all reviewed and agreed with or any disagreements were addressed in the HPI. REVIEW OF SYSTEMS :      Review of Systems   Constitutional:  Negative for appetite change and fatigue. HENT:  Negative for sore throat and trouble swallowing. Respiratory:  Negative for cough and shortness of breath. Cardiovascular:  Negative for chest pain, palpitations and leg swelling. Gastrointestinal:  Positive for nausea and vomiting. Negative for abdominal pain, constipation and diarrhea. Genitourinary:  Negative for difficulty urinating and dysuria. Musculoskeletal:  Negative for arthralgias and myalgias. Skin:  Negative for color change and rash. Neurological:  Negative for dizziness and headaches. Psychiatric/Behavioral:  Negative for confusion and decreased concentration. Positives and Pertinent negatives as per HPI.      SURGICAL HISTORY     Past Surgical History:   Procedure Laterality Date    APPENDECTOMY      HEMIARTHROPLASTY HIP Left 2/26/2019    LEFT  HIP HEMIARTHROPLASTY (MARTA) performed by Dorene Alba MD at [facility]       Discharge Medication List as of 2/23/2023 10:03 PM        CONTINUE these medications which have NOT CHANGED    Details   warfarin (COUMADIN) 2 MG tablet Take 2 mg by mouth nightlyHistorical Med      pantoprazole (PROTONIX) 20 MG tablet Take 1 tablet by mouth every morning (before breakfast), Disp-30 tablet, R-3DC to [facility]      labetalol (NORMODYNE) 100 MG tablet Take 100 mg by mouth 2 times dailyHistorical Med      metoprolol tartrate (LOPRESSOR) 25 MG tablet Take 1 tablet by mouth 2 times daily, Disp-60 tablet, R-3DC to [facility]             ALLERGIES     Patient has no known allergies. FAMILYHISTORY     No family history on file. SOCIAL HISTORY       Social History     Tobacco Use    Smoking status: Never    Smokeless tobacco: Never   Substance Use Topics    Alcohol use: No    Drug use: No       SCREENINGS        [place]arl Coma Scale  Eye Opening: Spontaneous  Best Verbal Response: Oriented  Best Motor Response: Obeys commands  [place] Coma Scale Score: 15                CIWA Assessment  BP: (!) 146/76  Heart Rate: 92           PHYSICAL EXAM  1 or more Elements     ED Triage Vitals [02/23/23 1014]   BP Temp Temp Source Heart Rate Resp SpO2 Height Weight   (!) 178/81 99.9 °F (37.7 °C) Axillary 84 15 94 % 5' 5\" (1.651 m) 102 lb (46.3 kg)       Physical Exam  Constitutional:       Appearance: Normal appearance. HENT:      Head: Normocephalic and atraumatic. Mouth/Throat:      Mouth: Mucous membranes are moist.   Eyes:      Extraocular Movements: Extraocular movements intact. Conjunctiva/sclera: Conjunctivae normal.   Cardiovascular:      Rate and Rhythm: Normal rate and regular rhythm. Pulses: Normal pulses. Heart sounds: Normal heart sounds. No murmur heard.   Pulmonary: Effort: Pulmonary effort is normal.      Breath sounds: No stridor. No wheezing. Abdominal:      General: Abdomen is flat. Bowel sounds are normal.      Palpations: Abdomen is soft. Tenderness: There is no abdominal tenderness. Musculoskeletal:         General: No swelling. Normal range of motion. Cervical back: Normal range of motion and neck supple. Skin:     General: Skin is warm and dry. Neurological:      General: No focal deficit present. Mental Status: She is alert. She is disoriented. Motor: No weakness. Coordination: Coordination normal.      Comments: Not fully compliant with exam due to irritability however no focal deficits on exam. No weakness. Psychiatric:         Mood and Affect: Mood normal.         Behavior: Behavior normal.     NIHSS score 1.      DIAGNOSTIC RESULTS   LABS:    Labs Reviewed   CBC WITH AUTO DIFFERENTIAL - Abnormal; Notable for the following components:       Result Value    Neutrophils % 87.4 (*)     Lymphocytes % 6.6 (*)     Neutrophils Absolute 9.09 (*)     Lymphocytes Absolute 0.69 (*)     Eosinophils Absolute 0.00 (*)     All other components within normal limits   COMPREHENSIVE METABOLIC PANEL - Abnormal; Notable for the following components:    Sodium 131 (*)     Chloride 95 (*)     Glucose 145 (*)     All other components within normal limits   PROTIME-INR - Abnormal; Notable for the following components:    Protime 13.0 (*)     All other components within normal limits   TROPONIN - Abnormal; Notable for the following components:    Troponin, High Sensitivity 13 (*)     All other components within normal limits   TROPONIN - Abnormal; Notable for the following components:    Troponin, High Sensitivity 20 (*)     All other components within normal limits   URINALYSIS WITH MICROSCOPIC - Abnormal; Notable for the following components:    Blood, Urine MODERATE (*)     Protein, UA 30 (*)     WBC, UA 5-10 (*)     Bacteria, UA MANY (*)     All other components within normal limits   POCT GLUCOSE - Abnormal; Notable for the following components:    Meter Glucose 135 (*)     All other components within normal limits   POCT GLUCOSE - Normal   COVID-19, RAPID   RAPID INFLUENZA A/B ANTIGENS   LIPASE   LACTIC ACID   D-DIMER, QUANTITATIVE       When ordered only abnormal lab results are displayed. All other labs were within normal range or not returned as of this dictation.      RADIOLOGY:   Non-plain film images such as CT, Ultrasound and MRI are read by the radiologist. Plain radiographic images are visualized and preliminarily interpreted by the ED Provider with the below findings:      Interpretation per the Radiologist below, if available at the time of this note:    US GALLBLADDER RUQ   Final Result   Distended gallbladder with no gallbladder wall thickening, stones or sludge.   No pericholecystic fluid.      Intra and extrahepatic biliary duct dilatation.  No obvious common bile duct   calculi.         CTA PULMONARY W CONTRAST   Final Result   No evidence of pulmonary embolism or acute pulmonary abnormality.      Markedly distended gallbladder is seen.         CT ABDOMEN PELVIS W IV CONTRAST Additional Contrast? None   Final Result   1. No acute process in the abdomen or pelvis.   2. Limited examination due to motion.   3. Diverticulosis.   4. Healing bilateral superior and inferior pubic rami fractures.   5. Healing bilateral sacral ala fractures.   6. Compression fracture involving T12 which shows progressive loss of height   and new mild retropulsion compared with previous exam.         XR CHEST PORTABLE   Final Result   Subtle left mid lung pneumonia suggested.  Mild cardiomegaly.  Mildly   tortuous thoracic aorta as before.         CT Head W/O Contrast   Final Result   1.  There is no acute intracranial abnormality.  Specifically, there is no   intracranial hemorrhage.   2. Atrophy and periventricular leukomalacia,   .           CT Head W/O  Contrast    Result Date: 2/23/2023  EXAMINATION: CT OF THE HEAD WITHOUT CONTRAST  2/23/2023 10:29 am TECHNIQUE: CT of the head was performed without the administration of intravenous contrast. Automated exposure control, iterative reconstruction, and/or weight based adjustment of the mA/kV was utilized to reduce the radiation dose to as low as reasonably achievable. COMPARISON: None. HISTORY: ORDERING SYSTEM PROVIDED HISTORY: n/v TECHNOLOGIST PROVIDED HISTORY: Reason for exam:->n/v Has a \"code stroke\" or \"stroke alert\" been called? ->No Decision Support Exception - unselect if not a suspected or confirmed emergency medical condition->Emergency Medical Condition (MA) FINDINGS: BRAIN/VENTRICLES: There is no acute intracranial hemorrhage, mass effect or midline shift. No abnormal extra-axial fluid collection. The gray-white differentiation is maintained without evidence of an acute infarct. There is no evidence of hydrocephalus. The ventricles, cisterns and sulci are prominent consistent with atrophy. There is decreased attenuation within the periventricular white matter consistent with periventricular leukomalacia. ORBITS: The visualized portion of the orbits demonstrate no acute abnormality. SINUSES: The visualized paranasal sinuses and mastoid air cells demonstrate no acute abnormality. SOFT TISSUES/SKULL:  No acute abnormality of the visualized skull or soft tissues. 1.  There is no acute intracranial abnormality. Specifically, there is no intracranial hemorrhage. 2. Atrophy and periventricular leukomalacia, . XR CHEST PORTABLE    Result Date: 2/23/2023  EXAMINATION: ONE XRAY VIEW OF THE CHEST 2/23/2023 9:34 am COMPARISON: 16 October 2022 HISTORY: ORDERING SYSTEM PROVIDED HISTORY: shortness of breath TECHNOLOGIST PROVIDED HISTORY: Reason for exam:->shortness of breath FINDINGS: Mildly tortuous thoracic aorta. Heart is mildly enlarged.   Pulmonary vascularity is normal.  Subtle left mid lung pneumonia suggested. Neither costophrenic angle is blunted. Subtle left mid lung pneumonia suggested. Mild cardiomegaly. Mildly tortuous thoracic aorta as before. No results found. PROCEDURES   Unless otherwise noted below, none     Procedures    CRITICAL CARE TIME   See attending attestation. PAST MEDICAL HISTORY      has a past medical history of Hypertension, Osteoporosis, and TIA (transient ischemic attack). EMERGENCY DEPARTMENT COURSE and DIFFERENTIAL DIAGNOSIS/MDM:   Vitals:    Vitals:    02/23/23 1944 02/23/23 1946 02/23/23 2226 02/23/23 2319   BP:  (!) 145/78  (!) 146/76   Pulse: 98 92     Resp: 20 20 20   Temp:   97.7 °F (36.5 °C)    TempSrc:   Oral    SpO2: 97% 97%  98%   Weight:       Height:           Patient was given the following medications:  Medications   diphenhydrAMINE (BENADRYL) injection 25 mg (25 mg IntraVENous Given 2/23/23 1134)   trimethobenzamide (TIGAN) injection 200 mg (200 mg IntraMUSCular Given 2/23/23 1122)   iopamidol (ISOVUE-370) 76 % injection 75 mL (75 mLs IntraVENous Given 2/23/23 1418)   0.9 % sodium chloride bolus (0 mLs IntraVENous Stopped 2/23/23 2100)       ED Course as of 02/24/23 1435   Thu Feb 23, 2023   1629 EKG: This EKG is signed and interpreted by me. Rate: 96  Rhythm: Sinus  Interpretation: non-specific EKG no visible ST elevations or depressions, , QRS 60, QTc 462  Comparison: No signal change compared to prior 10/16/2022   [BB]   2202 Signed out to me at 8 PM.  Please see prior treating physicians note for chief complaint, history of present illness, review of systems and initial exam.  Laboratory returned revealing a distended gallbladder but no evidence of acute cholecystitis. Laboratory otherwise were unremarkable. I reassessed the patient and examined her at 10 PM.  She has no more nausea or vomiting. She has no abdominal pain or tenderness. She states she feels much better.   She will be discharged to home with instructions to gradually advance her diet and return should her symptoms recur. [RM]      ED Course User Index  [BB] Geovanni Schmidt DO  [RM] Lisa Overton DO        CONSULTS: (Who and What was discussed)  None    Discussion with Other Profesionals : None    Social Determinants : None    Records Reviewed : Inpatient Notes   and Outpatient Notes      CC/HPI Summary, DDx, ED Course, and Reassessment:   Patient presenting with change in behavior per family. Patient presents to the ED due to vomiting and change in behavior per family. Differential diagnoses included but not limited to stroke, gastroenteritis, GERD. Discussed with family regarding behavior states that she has been \"thrashing about\". Due to patient being on Eliquis she would not qualify for TXA and with noting that last known well was 8 PM last night. Workup in the ED revealed POCT glucose 135. CBC and CMP within normal limits. Troponin 13 with repeat 20 likely due to demand ischemia. Lipase and lactic acid within normal limits. COVID and flu negative. UA unremarkable. D-dimer elevated at 529. CTA unremarkable for PE. Patient was given Tigan, Benadryl, 1 L bolus for their symptoms with good improvement. CT abdomen pelvis did not show any acute process. Imaging did show chronic gallbladder pathology and was further investigated with right upper quadrant ultrasound which did not show any acute pathology. There is gallbladder distention with however no thickening or stones. Patient continues to be non-toxic on re-evaluation. Findings were discussed with the patient and reasons to immediately return to the ED were articulated to them. They will follow-up with their PMD.      Disposition Considerations (tests considered but not done, Shared Decision Making, Pt Expectation of Test or Tx.):   Appropriate for outpatient management      FINAL IMPRESSION      1.  Nausea and vomiting, unspecified vomiting type          DISPOSITION/PLAN     DISPOSITION Decision To Discharge 02/23/2023 10:03:03 PM      PATIENT REFERRED TO:  Jenna Lim MD  [place] [address] [phone]    Schedule an appointment as soon as possible for a visit       DISCHARGE MEDICATIONS:  Discharge Medication List as of 2/23/2023 10:03 PM          DISCONTINUED MEDICATIONS:  Discharge Medication List as of 2/23/2023 10:03 PM                 (Please note that portions of this note were completed with a voice recognition program.  Efforts were made to edit the dictations but occasionally words are mis-transcribed.)    Karen Beckman MD (electronically signed)           Karen Beckman MD  Resident  02/24/23 [id number]

## 2023-02-24 LAB
EKG ATRIAL RATE: 96 BPM
EKG P AXIS: 42 DEGREES
EKG P-R INTERVAL: 144 MS
EKG Q-T INTERVAL: 366 MS
EKG QRS DURATION: 68 MS
EKG QTC CALCULATION (BAZETT): 462 MS
EKG R AXIS: -6 DEGREES
EKG T AXIS: 60 DEGREES
EKG VENTRICULAR RATE: 96 BPM

## 2023-02-24 PROCEDURE — 93010 ELECTROCARDIOGRAM REPORT: CPT | Performed by: INTERNAL MEDICINE

## 2023-02-24 NOTE — ED NOTES
Pt won't keep gown or blanket on, ripped out IV. Door and curtain open to keep eyes on.      Aida Putnam RN  02/23/23 [id number]

## 2023-02-24 NOTE — ED NOTES
Pt wont keep ekg leads on, ripped them off.  Changed bed and pt gown d/t wet     Dell Moreno RN  02/23/23 2119

## (undated) DEVICE — GOWN,SIRUS,FABRNF,XL,20/CS: Brand: MEDLINE

## (undated) DEVICE — TOTAL HIP PK

## (undated) DEVICE — SPONGE LAP W18XL18IN WHT COT 4 PLY FLD STRUNG RADPQ DISP ST

## (undated) DEVICE — DOUBLE BASIN SET: Brand: MEDLINE INDUSTRIES, INC.

## (undated) DEVICE — Device

## (undated) DEVICE — DRAPE,REIN 53X77,STERILE: Brand: MEDLINE

## (undated) DEVICE — PILLOW POS W15XH6XL22IN RASPBERRY FOAM ABD W/ STRP DISP FOR

## (undated) DEVICE — SYRINGE IRRIG 60ML SFT PLIABLE BLB EZ TO GRP 1 HND USE W/

## (undated) DEVICE — 3M™ STERI-DRAPE™ U-DRAPE 1015: Brand: STERI-DRAPE™

## (undated) DEVICE — SOLUTION IV IRRIG WATER 1000ML POUR BRL 2F7114

## (undated) DEVICE — COVER DSG W7 7 8XL11IN WHT POR CLTH PRECUT WTRPRF MEDIPORE

## (undated) DEVICE — CHLORAPREP 26ML ORANGE

## (undated) DEVICE — DRESSING,GAUZE,XEROFORM,CURAD,1"X8",ST: Brand: CURAD

## (undated) DEVICE — 2108 SERIES SAGITTAL BLADE, OFFSET (20.0 X 0.89 X 80.0MM)

## (undated) DEVICE — SOLUTION IV IRRIG POUR BRL 0.9% SODIUM CHL 2F7124

## (undated) DEVICE — TUBING, SUCTION, 9/32" X 10', STRAIGHT: Brand: MEDLINE

## (undated) DEVICE — PATIENT RETURN ELECTRODE, SINGLE-USE, CONTACT QUALITY MONITORING, ADULT, WITH 9FT CORD, FOR PATIENTS WEIGING OVER 33LBS. (15KG): Brand: MEGADYNE

## (undated) DEVICE — PACK PROCEDURE SURG GEN CUST

## (undated) DEVICE — 4-PORT MANIFOLD: Brand: NEPTUNE 2

## (undated) DEVICE — GLOVE RADIOLOGY 7.5 LTX ATTENUATION STRL

## (undated) DEVICE — TOWEL,OR,DSP,ST,BLUE,STD,6/PK,12PK/CS: Brand: MEDLINE

## (undated) DEVICE — GAUZE,SPONGE,4"X4",8PLY,STRL,LF,10/TRAY: Brand: MEDLINE

## (undated) DEVICE — SUTURE VCRL SZ 2-0 L27IN ABSRB UD L26MM SH 1/2 CIR J417H

## (undated) DEVICE — 3M™ IOBAN™ 2 ANTIMICROBIAL INCISE DRAPE 6650EZ: Brand: IOBAN™ 2